# Patient Record
Sex: MALE | Race: WHITE | Employment: FULL TIME | URBAN - METROPOLITAN AREA
[De-identification: names, ages, dates, MRNs, and addresses within clinical notes are randomized per-mention and may not be internally consistent; named-entity substitution may affect disease eponyms.]

---

## 2017-01-05 ENCOUNTER — OFFICE VISIT (OUTPATIENT)
Dept: FAMILY MEDICINE CLINIC | Age: 27
End: 2017-01-05

## 2017-01-05 VITALS
HEART RATE: 78 BPM | TEMPERATURE: 98.4 F | BODY MASS INDEX: 63.09 KG/M2 | OXYGEN SATURATION: 98 % | SYSTOLIC BLOOD PRESSURE: 120 MMHG | WEIGHT: 315 LBS | DIASTOLIC BLOOD PRESSURE: 84 MMHG

## 2017-01-05 DIAGNOSIS — J01.40 ACUTE NON-RECURRENT PANSINUSITIS: Primary | ICD-10-CM

## 2017-01-05 PROCEDURE — 99213 OFFICE O/P EST LOW 20 MIN: CPT | Performed by: FAMILY MEDICINE

## 2017-01-05 RX ORDER — CITALOPRAM 20 MG/1
20 TABLET ORAL DAILY
Qty: 30 TABLET | Refills: 2 | Status: SHIPPED | OUTPATIENT
Start: 2017-01-05 | End: 2017-10-27 | Stop reason: SDUPTHER

## 2017-01-05 RX ORDER — AZITHROMYCIN 250 MG/1
TABLET, FILM COATED ORAL
Qty: 6 TABLET | Refills: 0 | Status: SHIPPED | OUTPATIENT
Start: 2017-01-05 | End: 2017-01-15

## 2017-01-05 ASSESSMENT — ENCOUNTER SYMPTOMS
RESPIRATORY NEGATIVE: 1
SINUS PRESSURE: 1
GASTROINTESTINAL NEGATIVE: 1

## 2017-01-31 ENCOUNTER — OFFICE VISIT (OUTPATIENT)
Dept: PULMONOLOGY | Age: 27
End: 2017-01-31

## 2017-01-31 VITALS
SYSTOLIC BLOOD PRESSURE: 137 MMHG | RESPIRATION RATE: 24 BRPM | WEIGHT: 315 LBS | DIASTOLIC BLOOD PRESSURE: 87 MMHG | HEART RATE: 78 BPM | OXYGEN SATURATION: 98 % | HEIGHT: 73 IN | TEMPERATURE: 98.4 F | BODY MASS INDEX: 41.75 KG/M2

## 2017-01-31 DIAGNOSIS — G47.33 OSA TREATED WITH BIPAP: Primary | ICD-10-CM

## 2017-01-31 DIAGNOSIS — E66.01 OBESITY, MORBID, BMI 50 OR HIGHER (HCC): ICD-10-CM

## 2017-01-31 PROCEDURE — 99213 OFFICE O/P EST LOW 20 MIN: CPT | Performed by: NURSE PRACTITIONER

## 2017-01-31 ASSESSMENT — SLEEP AND FATIGUE QUESTIONNAIRES
HOW LIKELY ARE YOU TO NOD OFF OR FALL ASLEEP WHILE SITTING AND READING: 0
HOW LIKELY ARE YOU TO NOD OFF OR FALL ASLEEP IN A CAR, WHILE STOPPED FOR A FEW MINUTES IN TRAFFIC: 0
HOW LIKELY ARE YOU TO NOD OFF OR FALL ASLEEP WHILE SITTING INACTIVE IN A PUBLIC PLACE: 0
HOW LIKELY ARE YOU TO NOD OFF OR FALL ASLEEP WHILE SITTING QUIETLY AFTER LUNCH WITHOUT ALCOHOL: 0
HOW LIKELY ARE YOU TO NOD OFF OR FALL ASLEEP WHILE SITTING AND TALKING TO SOMEONE: 0
ESS TOTAL SCORE: 0
HOW LIKELY ARE YOU TO NOD OFF OR FALL ASLEEP WHEN YOU ARE A PASSENGER IN A CAR FOR AN HOUR WITHOUT A BREAK: 0
NECK CIRCUMFERENCE (INCHES): 20.5
HOW LIKELY ARE YOU TO NOD OFF OR FALL ASLEEP WHILE WATCHING TV: 0
HOW LIKELY ARE YOU TO NOD OFF OR FALL ASLEEP WHILE LYING DOWN TO REST IN THE AFTERNOON WHEN CIRCUMSTANCES PERMIT: 0

## 2017-10-27 ENCOUNTER — OFFICE VISIT (OUTPATIENT)
Dept: FAMILY MEDICINE CLINIC | Age: 27
End: 2017-10-27

## 2017-10-27 VITALS
OXYGEN SATURATION: 98 % | DIASTOLIC BLOOD PRESSURE: 84 MMHG | BODY MASS INDEX: 42.66 KG/M2 | HEART RATE: 78 BPM | HEIGHT: 72 IN | SYSTOLIC BLOOD PRESSURE: 138 MMHG | WEIGHT: 315 LBS

## 2017-10-27 DIAGNOSIS — F33.1 MODERATE RECURRENT MAJOR DEPRESSION (HCC): ICD-10-CM

## 2017-10-27 DIAGNOSIS — Z23 NEED FOR INFLUENZA VACCINATION: ICD-10-CM

## 2017-10-27 PROCEDURE — G8417 CALC BMI ABV UP PARAM F/U: HCPCS | Performed by: NURSE PRACTITIONER

## 2017-10-27 PROCEDURE — G8427 DOCREV CUR MEDS BY ELIG CLIN: HCPCS | Performed by: NURSE PRACTITIONER

## 2017-10-27 PROCEDURE — G8484 FLU IMMUNIZE NO ADMIN: HCPCS | Performed by: NURSE PRACTITIONER

## 2017-10-27 PROCEDURE — 90686 IIV4 VACC NO PRSV 0.5 ML IM: CPT | Performed by: NURSE PRACTITIONER

## 2017-10-27 PROCEDURE — 90471 IMMUNIZATION ADMIN: CPT | Performed by: NURSE PRACTITIONER

## 2017-10-27 PROCEDURE — 99213 OFFICE O/P EST LOW 20 MIN: CPT | Performed by: NURSE PRACTITIONER

## 2017-10-27 PROCEDURE — 1036F TOBACCO NON-USER: CPT | Performed by: NURSE PRACTITIONER

## 2017-10-27 RX ORDER — CITALOPRAM 20 MG/1
20 TABLET ORAL DAILY
Qty: 30 TABLET | Refills: 0 | Status: SHIPPED | OUTPATIENT
Start: 2017-10-27 | End: 2018-11-30

## 2017-10-27 ASSESSMENT — PATIENT HEALTH QUESTIONNAIRE - PHQ9
1. LITTLE INTEREST OR PLEASURE IN DOING THINGS: 2
7. TROUBLE CONCENTRATING ON THINGS, SUCH AS READING THE NEWSPAPER OR WATCHING TELEVISION: 1
3. TROUBLE FALLING OR STAYING ASLEEP: 1
SUM OF ALL RESPONSES TO PHQ QUESTIONS 1-9: 10
6. FEELING BAD ABOUT YOURSELF - OR THAT YOU ARE A FAILURE OR HAVE LET YOURSELF OR YOUR FAMILY DOWN: 1
9. THOUGHTS THAT YOU WOULD BE BETTER OFF DEAD, OR OF HURTING YOURSELF: 0
SUM OF ALL RESPONSES TO PHQ9 QUESTIONS 1 & 2: 4
10. IF YOU CHECKED OFF ANY PROBLEMS, HOW DIFFICULT HAVE THESE PROBLEMS MADE IT FOR YOU TO DO YOUR WORK, TAKE CARE OF THINGS AT HOME, OR GET ALONG WITH OTHER PEOPLE: 2
8. MOVING OR SPEAKING SO SLOWLY THAT OTHER PEOPLE COULD HAVE NOTICED. OR THE OPPOSITE, BEING SO FIGETY OR RESTLESS THAT YOU HAVE BEEN MOVING AROUND A LOT MORE THAN USUAL: 2
2. FEELING DOWN, DEPRESSED OR HOPELESS: 2
4. FEELING TIRED OR HAVING LITTLE ENERGY: 1
5. POOR APPETITE OR OVEREATING: 0

## 2017-10-27 NOTE — LETTER
[facility]  [address] [place]  Phone: [phone]  Fax: [phone]    May Burnham NP        October 27, 2017     Patient: Julisa Sim   YOB: 1990   Date of Visit: 10/27/2017       To Whom it May Concern:    To Whitlock was seen in my clinic on 10/27/2017. Due to acute exacerbation of chronic condition, patient missed work 10/24-27/2017. He may return to work on next work day without restrictions . If you have any questions or concerns, please don't hesitate to call.     Sincerely,           May Burnham NP

## 2017-10-27 NOTE — PROGRESS NOTES
Subjective:      Patient ID: Eugenio Esteves is a 32 y.o. male. HPI     Chief Complaint   Patient presents with    Depression     Depression worsened, no motivation, \"dragging me down\", missed work a few days due to worsening symptoms, +hx of depression in the past, stressed out about money, job changes. No SI thoughts or thoughts to hurt others. Reports social anxiety. Sleeping ok. Was prescribed Celexa in the past per PCP, would like to restart it. Patient  reports that he has never smoked. He has never used smokeless tobacco. He reports that he does not drink alcohol or use drugs. PHQ Scores 10/27/2017   PHQ2 Score 4   PHQ9 Score 10     Interpretation of Total Score Depression Severity: 1-4 = Minimal depression, 5-9 = Mild depression, 10-14 = Moderate depression, 15-19 = Moderately severe depression, 20-27 = Severe depression    Patient  reports that he has never smoked. He has never used smokeless tobacco. He reports that he does not drink alcohol or use drugs. Past medical, surgical, family and social history were reviewed and updated with the patient. Review of Systems   Psychiatric/Behavioral: Positive for dysphoric mood. Negative for agitation, behavioral problems, self-injury, sleep disturbance and suicidal ideas. Objective:   Physical Exam   Constitutional: He is oriented to person, place, and time. He appears well-developed. No distress. Neurological: He is alert and oriented to person, place, and time. Psychiatric: He has a normal mood and affect. His behavior is normal.   Nursing note and vitals reviewed. Assessment:      1. Moderate recurrent major depression ([facility] [address]    2. Need for influenza vaccination          Plan:      Herrimangood Salvador was seen today for depression. Diagnoses and all orders for this visit:    Moderate recurrent major depression (HCC)  -     Restart citalopram (CELEXA) 20 MG tablet; Take 1 tablet by mouth daily, call if side effects.  Suggested to see

## 2018-01-31 ENCOUNTER — TELEPHONE (OUTPATIENT)
Dept: PULMONOLOGY | Age: 28
End: 2018-01-31

## 2018-02-13 NOTE — TELEPHONE ENCOUNTER
Noted. Patient did not keep follow up appointment as was recommended. Please schedule a follow up visit for this patient when he calls requesting such appointment.

## 2018-11-30 ENCOUNTER — HOSPITAL ENCOUNTER (EMERGENCY)
Age: 28
Discharge: HOME | End: 2018-11-30
Payer: COMMERCIAL

## 2018-11-30 VITALS
BODY MASS INDEX: 42.66 KG/M2 | OXYGEN SATURATION: 99 % | SYSTOLIC BLOOD PRESSURE: 153 MMHG | HEART RATE: 97 BPM | TEMPERATURE: 98.5 F | RESPIRATION RATE: 24 BRPM | WEIGHT: 315 LBS | DIASTOLIC BLOOD PRESSURE: 90 MMHG | HEIGHT: 72 IN

## 2018-11-30 DIAGNOSIS — J01.00 ACUTE MAXILLARY SINUSITIS, RECURRENCE NOT SPECIFIED: Primary | ICD-10-CM

## 2018-11-30 PROCEDURE — 99282 EMERGENCY DEPT VISIT SF MDM: CPT

## 2018-11-30 RX ORDER — AMOXICILLIN AND CLAVULANATE POTASSIUM 875; 125 MG/1; MG/1
1 TABLET, FILM COATED ORAL 2 TIMES DAILY
Qty: 20 TABLET | Refills: 0 | Status: SHIPPED | OUTPATIENT
Start: 2018-11-30 | End: 2018-12-10

## 2018-11-30 RX ORDER — PREDNISONE 10 MG/1
60 TABLET ORAL DAILY
Qty: 30 TABLET | Refills: 0 | Status: SHIPPED | OUTPATIENT
Start: 2018-11-30 | End: 2018-12-05

## 2018-11-30 RX ORDER — FLUTICASONE PROPIONATE 50 MCG
1 SPRAY, SUSPENSION (ML) NASAL DAILY
Qty: 1 BOTTLE | Refills: 0 | Status: SHIPPED | OUTPATIENT
Start: 2018-11-30 | End: 2019-11-07

## 2018-11-30 ASSESSMENT — ENCOUNTER SYMPTOMS
EYES NEGATIVE: 1
SINUS PRESSURE: 1
RESPIRATORY NEGATIVE: 1
SINUS PAIN: 1
GASTROINTESTINAL NEGATIVE: 1
RHINORRHEA: 1

## 2018-12-01 NOTE — ED PROVIDER NOTES
Respiratory: Negative. Cardiovascular: Negative. Gastrointestinal: Negative. Genitourinary: Negative. Musculoskeletal: Negative. Skin: Negative. Neurological: Negative. Positives and Pertinent negatives as per HPI. Except as noted above in the ROS, problem specific ROS was completed and is negative. Physical Exam:  Physical Exam   Constitutional: He is oriented to person, place, and time. He appears well-developed and well-nourished. Non-toxic appearance. He does not have a sickly appearance. He does not appear ill. No distress. HENT:   Head: Normocephalic and atraumatic. Right Ear: Tympanic membrane normal. Tympanic membrane is not injected. Left Ear: Tympanic membrane normal. Tympanic membrane is not injected. Nose: Nose normal.   Mouth/Throat: Uvula is midline, oropharynx is clear and moist and mucous membranes are normal. No oropharyngeal exudate, posterior oropharyngeal edema, posterior oropharyngeal erythema or tonsillar abscesses. Eyes: Right eye exhibits no discharge. Left eye exhibits no discharge. Neck: Trachea normal, normal range of motion and full passive range of motion without pain. Neck supple. Cardiovascular: Normal rate, regular rhythm and normal heart sounds. Exam reveals no gallop. No murmur heard. Pulmonary/Chest: Effort normal and breath sounds normal. No respiratory distress. He has no wheezes. He has no rales. He exhibits no tenderness. Abdominal: Soft. Normal appearance and bowel sounds are normal. There is no tenderness. Musculoskeletal: Normal range of motion. He exhibits no deformity. Neurological: He is alert and oriented to person, place, and time. Skin: Skin is warm and dry. He is not diaphoretic. Psychiatric: He has a normal mood and affect. His behavior is normal.   Nursing note and vitals reviewed.       MEDICAL DECISION MAKING    Vitals:    Vitals:    11/30/18 2236   BP: (!) 153/90   Pulse: 97   Resp: 24   Temp: 98.5 °F PM      PATIENT REFERRED TO:  Romana Higgins DO  [address]  [address] [place] [address]  [phone]    Schedule an appointment as soon as possible for a visit   As needed, If symptoms worsen      DISCHARGE MEDICATIONS:  Discharge Medication List as of 11/30/2018 11:09 PM      START taking these medications    Details   amoxicillin-clavulanate (AUGMENTIN) 875-125 MG per tablet Take 1 tablet by mouth 2 times daily for 10 days, Disp-20 tablet, R-0Print      predniSONE (DELTASONE) 10 MG tablet Take 6 tablets by mouth daily for 5 doses, Disp-30 tablet, R-0Print      fluticasone (FLONASE) 50 MCG/ACT nasal spray 1 spray by Nasal route daily, Disp-1 Bottle, R-0Print             DISCONTINUED MEDICATIONS:  Discharge Medication List as of 11/30/2018 11:09 PM      STOP taking these medications       citalopram (CELEXA) 20 MG tablet Comments:   Reason for Stopping:                      (Please note the MDM and HPI sections of this note were completed with a voice recognition program.  Efforts weremade to edit the dictations but occasionally words are mis-transcribed.)    Electronically signed, Rosa Nieves PA-C,          Rosa Nieves PA-C  12/01/18 [id number]

## 2019-05-21 ENCOUNTER — OFFICE VISIT (OUTPATIENT)
Dept: PULMONOLOGY | Age: 29
End: 2019-05-21
Payer: COMMERCIAL

## 2019-05-21 VITALS
SYSTOLIC BLOOD PRESSURE: 146 MMHG | HEART RATE: 88 BPM | RESPIRATION RATE: 20 BRPM | OXYGEN SATURATION: 98 % | TEMPERATURE: 97.9 F | DIASTOLIC BLOOD PRESSURE: 87 MMHG | WEIGHT: 315 LBS | HEIGHT: 72 IN | BODY MASS INDEX: 42.66 KG/M2

## 2019-05-21 DIAGNOSIS — Z71.89 ENCOUNTER FOR BIPAP USE COUNSELING: ICD-10-CM

## 2019-05-21 DIAGNOSIS — G47.33 SEVERE OBSTRUCTIVE SLEEP APNEA: Primary | ICD-10-CM

## 2019-05-21 DIAGNOSIS — E66.01 OBESITY, MORBID, BMI 50 OR HIGHER (HCC): ICD-10-CM

## 2019-05-21 PROCEDURE — 99213 OFFICE O/P EST LOW 20 MIN: CPT | Performed by: NURSE PRACTITIONER

## 2019-05-21 ASSESSMENT — SLEEP AND FATIGUE QUESTIONNAIRES
HOW LIKELY ARE YOU TO NOD OFF OR FALL ASLEEP WHILE SITTING AND READING: 0
HOW LIKELY ARE YOU TO NOD OFF OR FALL ASLEEP WHEN YOU ARE A PASSENGER IN A CAR FOR AN HOUR WITHOUT A BREAK: 0
HOW LIKELY ARE YOU TO NOD OFF OR FALL ASLEEP WHILE LYING DOWN TO REST IN THE AFTERNOON WHEN CIRCUMSTANCES PERMIT: 0
HOW LIKELY ARE YOU TO NOD OFF OR FALL ASLEEP WHILE WATCHING TV: 0
ESS TOTAL SCORE: 0
NECK CIRCUMFERENCE (INCHES): 20.5
HOW LIKELY ARE YOU TO NOD OFF OR FALL ASLEEP IN A CAR, WHILE STOPPED FOR A FEW MINUTES IN TRAFFIC: 0
HOW LIKELY ARE YOU TO NOD OFF OR FALL ASLEEP WHILE SITTING INACTIVE IN A PUBLIC PLACE: 0
HOW LIKELY ARE YOU TO NOD OFF OR FALL ASLEEP WHILE SITTING QUIETLY AFTER LUNCH WITHOUT ALCOHOL: 0
HOW LIKELY ARE YOU TO NOD OFF OR FALL ASLEEP WHILE SITTING AND TALKING TO SOMEONE: 0

## 2019-05-21 NOTE — PROGRESS NOTES
Orders verbalized by Morenita August CNP;  1 yr   Orders faxed to DME:[facility].  FF Mask, Bariatric referral

## 2019-05-21 NOTE — PATIENT INSTRUCTIONS
.Please keep all of your future appointments scheduled by [facility][1] [facility] Pulmonary office. Out of respect for other patients and providers, you may be asked to reschedule your appointment if you arrive later than your scheduled appointment time. Appointments cancelled less than 24hrs in advance will be considered a no show. Patients with three missed appointments within 1 year or four missed appointments within 2 years can be dismissed from the practice. Remember to bring your sleep machine, cord and mask to each appointment    You should have a follow up appointment scheduled with a sleep medicine provider within 12 months. Routine parts, masks, tubing and filters should all be obtainable from your DME (equipment) provider. Any problems related to mask fit, comfort or functioning of equipment should also be addressed directly with your DME provider. If you are unable to resolve problems, then please notify our office and schedule an appointment to be seen sooner than the usual follow up appointment. For all patients who are evaluated for sleep disorders, never drive or operate motorized equipment while sleepy, fatigued or groggy. Please bring in all of your sleep equipment to all of your appointments, including machine, mask, cords and hoses. Here are some tips to to getting better sleep  1- Avoid napping during the day: This will ensure you are tired at bedtime. If you have to take a nap, sleep less than one hour, before 3 pm.   2- Exercise regularly, but not right before bed: but the timing of the workout is important. Exercising in the morning or early afternoon will not interfere with sleep. Exercising within two hours before bedtime can decrease your ability to fall asleep. Regular exercise is recommended to help you deepen the sleep. 3- Avoid heavy, spicy, or sugary foods 4-6 hours before bedtime: These can affect your ability to stay asleep.   4- Have a light snack before bed: Having an empty stomach can interfere with your sleep. Dairy products and turkey contain tryptophan, which acts as a natural sleep inducer. 5- Stay away from caffeine, nicotine and alcohol at least 4-6 hours before bed: Caffeine and nicotine are stimulants that interfere with your ability to fall asleep. While alcohol has an immediate sleep-inducing effect, a few hours later, as alcohol levels in your blood start to fall, there is a stimulant effect and you will experience fragmented sleep. 6- Take a hot bath 90 minutes before bedtime:  A hot bath will raise your body temperature, but it is the drop in body temperature that may leave you feeling sleepy  7- Develop sleep rituals: it is important to give your body cues that it is time to slow down and sleep. Listen to relaxing music, read something soothing for 15 minutes, have a cup of caffeine free tea, or do relaxation exercises such as yoga or deep breathing help relieve anxiety and reduce muscle tension. 8- Fix a bedtime and an awakening time: Even on weekends! When your sleep cycle has a regular rhythm, you will feel better. 9- Sleep only when sleepy: This reduces the time you are awake in bed. 10- Get into your favorite sleeping position: If you can't fall asleep within 15-30 minutes, get up and do something boring until you feel sleepy. Sit quietly in the dark or read the warranty on your refrigerator. Don't expose yourself to bright light while you are up, it gives cues to your brain that it is time to wake up. 11- Only use your bed for sleeping: Dont use the bed as an office, workroom or recreation room. Let your body \"know\" that the bed is associated with sleeping  12- Use comfortable bedding. Uncomfortable bedding can prevent good sleep. Evaluate whether or not this is a source of your problem, and make appropriate changes. 13- Make sure your bed and bedroom are quiet and comfortable: A hot room can be uncomfortable.  A cooler room, along with enough blankets to stay warm is recommended. Get a blackout shade or wear a slumber mask and wear earplugs or get a \"white noise\" machine for light and noise distractions. 14- Use sunlight to set your biological clock: When you get up in the morning, go outside and turn your face to the sun for 15 minutes. 13- Dont take your worries to bed: Leave worries about job, school, daily life, etc., behind when you go to bed. Some people find it useful to assign a \"worry period\" during the evening or afternoon for these issues. CPAP Equipment Cleaning and Disinfecting Schedule  Equipment Cleaning Frequency Instructions  Disinfecting Frequency   Non-Disposable Filters  Weekly Mild soapy water, Rinse, Air Dry Not Required   Disposable Filters Change as needed  2-4 weeks Do Not Wash Not Required   Hose/tubing Daily Mild soapy water, Rinse, Air Dry Once a week   Mask / Nasal Pillows Daily Mild soapy water, Rinse, Air Dry Once a week   Headgear Weekly Hand wash, Mild soapy water, Rinse, Dry  Not Required   Humidifier Daily Empty water daily  Mild soapy water, Rinse well, Air Dry  Once a week   CPAP Unit As Needed Dust with damp cloth,  No detergents or sprays Not Required         Disinfect (per schedule) with 1 part white vinegar and 3 parts water- soak mask and water chamber for 30 minutes every 1-2 weeks, more often if sick. Allow water/vinegar mixture to run through tubing. Allow all equipment to air dry. Drying Hints:   Always hang tubing away from direct sunlight, as this will cause the tubing to become yellow, brittle and crack over a period of time. DO NOT attach the wet tubing to your CPAP unit to blow-dry it. The moisture from the tubing can drain back into your machine. Moisture in your unit can cause sudden pressure increases or short circuits  DO's and DON'Ts:  - Don't use alcohol-based products to clean your mask, because it can cause the materials to become hard and brittle.    - Don't put headgear in the washer or dryer  - Don't use any caustic or household cleaning solutions such as bleach on your CPAP   equipment.  - Do follow the recommended cleaning schedule. - Do change your disposable filter frequently. Adapted From: [email or web address]. shtm.   These are general suggestions for all models please follow specific s recommendations and specific instructions

## 2019-05-21 NOTE — PROGRESS NOTES
88, temperature 97.9 °F (36.6 °C), temperature source Oral, resp. rate 20, height 6' (1.829 m), weight (!) 504 lb (228.6 kg), SpO2 98 %. 'RA  Gen: No acute distress. Obese. BMI of 68.35  Eyes: PERRL. No sclera icterus. No conjunctival injection. ENT: No discharge. Pharynx clear. Mallampati class IV. Neck: Trachea midline. No obvious mass. Neck circumference 20.5\"  Resp: No accessory muscle use. No crackles. No wheezes. No rhonchi. CV: Regular rate. Regular rhythm. No murmur or rub. Skin: Warm and dry. M/S: No cyanosis. No obvious joint deformity. Neuro: Awake. Alert. Moves all four extremities. Psych: Oriented x 3. No anxiety. Data:   Split night 8/25/15: .7 Oxygen desaturation to 72%. Study showed controlled sleep related breathing disorder with BiPAP. Started on AutoBiPAP EPAP min 11, IPAP max 20 and PS of 4 cmH2O. BIPAP compliance data:  Compliance download report from 1/1/17to 1/30/17 reviewed today by me and showed patient is using machine 7:57 hrs/night with 100% compliance and AHI 4.0 within this time frame. 30/30days with greater than 4 hours of machine use. 90% pressure 19.4/15.4 cm H20    Compliance download report from 4/20/19 to 5/19/19 reviewed today by me and showed patient is using machine 7:52 hrs/night with 100% compliance and AHI 1.9 within this time frame. 30/30days with greater than 4 hours of machine use. 90% pressure 19.4/15.4 cm H20 on auto BIPAP    Assessment:      1. Severe TORI. .7. Auto BIPAP EPAP min 13, IPAP max 22, PS 4, Full face mask. Optimal compliance and efficacy on review today. 2. Witnessed apnea - resolved on BIPAP  3. HTN  4. Obesity      Plan:      - Continue  AutoBiPAP EPAP min 13, IPAP max 22 and PS of 4 cmH2O.  -Order for new supplies  - [employer]- send referral today  - Advised to use BiPAP 6-8 hrs at night and during naps. - Replacement of mask, tubing, head straps every 3-6 months or sooner if damaged. - Patient instructed to contact DME company for any mask, tubing or machine trouble shooting if problems arise.  - Sleep hygiene  - Avoid sedatives, alcohol and caffeinated drinks at bed time. - Patient counseled to never drive or operate heavy machinery while fatigue, drowsy or sleepy.    - Weight loss is recommended as a long-term intervention.    - Complications of TORI if not treated were discussed with patient patient, including: systemic hypertension, pulmonary hypertension, cardiovascular morbidities, car accidents and all cause mortality.  -Patient education handout provided regarding sleep tips and PAP cleaning recommendations           Follow-up: 1 year, sooner if needed

## 2019-11-07 ENCOUNTER — OFFICE VISIT (OUTPATIENT)
Dept: FAMILY MEDICINE CLINIC | Age: 29
End: 2019-11-07
Payer: COMMERCIAL

## 2019-11-07 VITALS — SYSTOLIC BLOOD PRESSURE: 150 MMHG | DIASTOLIC BLOOD PRESSURE: 96 MMHG | OXYGEN SATURATION: 98 % | HEART RATE: 96 BPM

## 2019-11-07 DIAGNOSIS — G47.33 SEVERE OBSTRUCTIVE SLEEP APNEA: ICD-10-CM

## 2019-11-07 DIAGNOSIS — I10 ESSENTIAL HYPERTENSION: ICD-10-CM

## 2019-11-07 DIAGNOSIS — E55.9 VITAMIN D DEFICIENCY: ICD-10-CM

## 2019-11-07 DIAGNOSIS — Z23 NEED FOR INFLUENZA VACCINATION: ICD-10-CM

## 2019-11-07 DIAGNOSIS — E66.01 OBESITY, MORBID, BMI 50 OR HIGHER (HCC): ICD-10-CM

## 2019-11-07 DIAGNOSIS — Z00.00 HEALTHCARE MAINTENANCE: Primary | ICD-10-CM

## 2019-11-07 PROCEDURE — 90471 IMMUNIZATION ADMIN: CPT | Performed by: FAMILY MEDICINE

## 2019-11-07 PROCEDURE — 99214 OFFICE O/P EST MOD 30 MIN: CPT | Performed by: FAMILY MEDICINE

## 2019-11-07 PROCEDURE — 90686 IIV4 VACC NO PRSV 0.5 ML IM: CPT | Performed by: FAMILY MEDICINE

## 2019-11-07 RX ORDER — BLOOD PRESSURE TEST KIT
1 KIT MISCELLANEOUS DAILY
Qty: 1 KIT | Refills: 0 | Status: SHIPPED | OUTPATIENT
Start: 2019-11-07 | End: 2020-05-20 | Stop reason: CLARIF

## 2019-11-07 RX ORDER — ACETAMINOPHEN 500 MG
500 TABLET ORAL EVERY 6 HOURS PRN
COMMUNITY
End: 2020-07-09 | Stop reason: ALTCHOICE

## 2019-11-07 RX ORDER — LISINOPRIL 10 MG/1
10 TABLET ORAL DAILY
Qty: 30 TABLET | Refills: 1 | Status: SHIPPED | OUTPATIENT
Start: 2019-11-07 | End: 2020-01-02

## 2019-11-07 ASSESSMENT — ENCOUNTER SYMPTOMS
COUGH: 0
CHEST TIGHTNESS: 0
VOMITING: 0
SHORTNESS OF BREATH: 0
ABDOMINAL PAIN: 0
NAUSEA: 0

## 2020-01-02 ENCOUNTER — OFFICE VISIT (OUTPATIENT)
Dept: FAMILY MEDICINE CLINIC | Age: 30
End: 2020-01-02
Payer: COMMERCIAL

## 2020-01-02 VITALS
SYSTOLIC BLOOD PRESSURE: 140 MMHG | BODY MASS INDEX: 41.75 KG/M2 | HEART RATE: 102 BPM | OXYGEN SATURATION: 98 % | DIASTOLIC BLOOD PRESSURE: 80 MMHG | WEIGHT: 315 LBS | HEIGHT: 73 IN

## 2020-01-02 PROCEDURE — 99213 OFFICE O/P EST LOW 20 MIN: CPT | Performed by: FAMILY MEDICINE

## 2020-01-02 RX ORDER — LISINOPRIL AND HYDROCHLOROTHIAZIDE 12.5; 1 MG/1; MG/1
1 TABLET ORAL DAILY
Qty: 90 TABLET | Refills: 0 | Status: SHIPPED | OUTPATIENT
Start: 2020-01-02 | End: 2020-03-25

## 2020-01-02 RX ORDER — LISINOPRIL 10 MG/1
TABLET ORAL
Qty: 30 TABLET | Refills: 1 | OUTPATIENT
Start: 2020-01-02

## 2020-01-02 ASSESSMENT — ENCOUNTER SYMPTOMS
CHEST TIGHTNESS: 0
COUGH: 0
SHORTNESS OF BREATH: 0

## 2020-01-02 NOTE — PATIENT INSTRUCTIONS
Essential hypertension  Finish the present medication and I will send over a prescription for lisinopril 10 mg/hydrochlorothiazide 12-1/2- see me 3 months. Obesity, morbid, BMI 50 or higher ([address]  Work on weight loss by lowering carbs and increasing activity and I told him to call me if he would like to see the bariatric medicine department for an evaluation. Other orders  -     lisinopril-hydrochlorothiazide (PRINZIDE;ZESTORETIC) 10-12.5 MG per tablet; Take 1 tablet by mouth daily    See me 3 months.

## 2020-01-02 NOTE — PROGRESS NOTES
Subjective:      Patient ID: Erika Bhatti is a 34 y.o. male. HPI  Patient in for 1 month checkup on blood pressure. He had been off the medication for a while because he was running okay and he saw Dr. Falcon Friend a month ago and she put him back on the lisinopril. No ill effects from the medication and the pressure is better but still a few points above normal.  He has gained a lot of weight in the last few years. Prior to Visit Medications :  Medication lisinopril-hydrochlorothiazide (PRINZIDE;ZESTORETIC) 10-12.5 MG per tablet, Sig Take 1 tablet by mouth daily, Taking? Yes, Authorizing Provider Abraham Das, DO    Medication acetaminophen (TYLENOL) 500 MG tablet, Sig Take 500 mg by mouth every 6 hours as needed for Pain, Taking? Yes, Authorizing Provider Historical Provider, MD    Medication Blood Pressure KIT, Sig 1 kit by Does not apply route daily, Taking? Yes, Authorizing Provider Jason Canavan, MD      Past Medical History:  No date: Sleep apnea        Review of Systems    Review of Systems   Constitutional: Positive for unexpected weight change. Respiratory: Negative for cough, chest tightness and shortness of breath. Cardiovascular: Negative for chest pain, palpitations and leg swelling. Neurological: Negative for dizziness, light-headedness and headaches. Objective:   Physical Exam      Physical Exam  Constitutional:       Appearance: Normal appearance. He is obese. Cardiovascular:      Rate and Rhythm: Normal rate and regular rhythm. Heart sounds: Normal heart sounds. Pulmonary:      Effort: Pulmonary effort is normal.      Breath sounds: Normal breath sounds. Abdominal:      Palpations: Abdomen is soft. Tenderness: There is no tenderness. Musculoskeletal:      Right lower leg: No edema. Left lower leg: No edema. Neurological:      Mental Status: He is oriented to person, place, and time. Assessment:      1. Essential hypertension    2.  Obesity, morbid, BMI 50 or higher ([facility]            Plan:      Sarah Bose was seen today for discuss medications. Diagnoses and all orders for this visit:    Essential hypertension  Finish the present medication and I will send over a prescription for lisinopril 10 mg/hydrochlorothiazide 12-1/2- see me 3 months. Obesity, morbid, BMI 50 or higher ([facility]  Work on weight loss by lowering carbs and increasing activity and I told him to call me if he would like to see the bariatric medicine department for an evaluation. Other orders  -     lisinopril-hydrochlorothiazide (PRINZIDE;ZESTORETIC) 10-12.5 MG per tablet; Take 1 tablet by mouth daily    See me 3 months.           Abraham Das, DO

## 2020-01-31 ENCOUNTER — TELEPHONE (OUTPATIENT)
Dept: FAMILY MEDICINE CLINIC | Age: 30
End: 2020-01-31

## 2020-01-31 NOTE — TELEPHONE ENCOUNTER
Pt. Is asking for a referral to a Bariatric  Doctor.   Please call patient when referral has been placed

## 2020-02-04 NOTE — TELEPHONE ENCOUNTER
Patient requesting a [employer] message or email be sent with the referral info. He was not able to write down the info when he called.

## 2020-03-09 ENCOUNTER — TELEPHONE (OUTPATIENT)
Dept: BARIATRICS/WEIGHT MGMT | Age: 30
End: 2020-03-09

## 2020-05-14 ENCOUNTER — TELEPHONE (OUTPATIENT)
Dept: PULMONOLOGY | Age: 30
End: 2020-05-14

## 2020-05-16 ENCOUNTER — TELEPHONE (OUTPATIENT)
Dept: FAMILY MEDICINE CLINIC | Age: 30
End: 2020-05-16

## 2020-05-18 RX ORDER — LISINOPRIL AND HYDROCHLOROTHIAZIDE 12.5; 1 MG/1; MG/1
1 TABLET ORAL DAILY
Qty: 90 TABLET | Refills: 0 | Status: SHIPPED | OUTPATIENT
Start: 2020-05-18 | End: 2020-06-27 | Stop reason: SDUPTHER

## 2020-05-20 ENCOUNTER — VIRTUAL VISIT (OUTPATIENT)
Dept: PULMONOLOGY | Age: 30
End: 2020-05-20
Payer: COMMERCIAL

## 2020-05-20 PROCEDURE — 99213 OFFICE O/P EST LOW 20 MIN: CPT | Performed by: NURSE PRACTITIONER

## 2020-05-20 ASSESSMENT — SLEEP AND FATIGUE QUESTIONNAIRES
HOW LIKELY ARE YOU TO NOD OFF OR FALL ASLEEP WHILE SITTING AND TALKING TO SOMEONE: 0
HOW LIKELY ARE YOU TO NOD OFF OR FALL ASLEEP IN A CAR, WHILE STOPPED FOR A FEW MINUTES IN TRAFFIC: 0
HOW LIKELY ARE YOU TO NOD OFF OR FALL ASLEEP WHILE SITTING AND READING: 0
HOW LIKELY ARE YOU TO NOD OFF OR FALL ASLEEP WHILE SITTING INACTIVE IN A PUBLIC PLACE: 0
ESS TOTAL SCORE: 1
HOW LIKELY ARE YOU TO NOD OFF OR FALL ASLEEP WHILE LYING DOWN TO REST IN THE AFTERNOON WHEN CIRCUMSTANCES PERMIT: 1
HOW LIKELY ARE YOU TO NOD OFF OR FALL ASLEEP WHILE SITTING QUIETLY AFTER LUNCH WITHOUT ALCOHOL: 0
HOW LIKELY ARE YOU TO NOD OFF OR FALL ASLEEP WHEN YOU ARE A PASSENGER IN A CAR FOR AN HOUR WITHOUT A BREAK: 0
HOW LIKELY ARE YOU TO NOD OFF OR FALL ASLEEP WHILE WATCHING TV: 0

## 2020-05-20 NOTE — PROGRESS NOTES
MA Communication: The following orders are received by verbal communication from SELVIN Gallagher.     Orders include:  Orders faxed to [facility]       31-90 day fu scheduled 7/19/20

## 2020-05-20 NOTE — PATIENT INSTRUCTIONS
Remember to bring all pulmonary medications to your next appointment with the office. Please keep all of your future appointments scheduled by [address], [facility] Pulmonary office. Out of respect for other patients and providers, you may be asked to reschedule your appointment if you arrive later than your scheduled appointment time. Appointments cancelled less than 24hrs in advance will be considered a no show. Patients with three missed appointments within 1 year or four missed appointments within 2 years can be dismissed from the practice. You may receive a survey regarding the care you received during your visit. Your input is valuable to us. We encourage you to complete and return your survey. We hope you will choose us in the future for your healthcare needs. Sleep Hygiene. .. Tips for better sleep. .. Avoid naps. This will ensure you are sleepy at bedtime. If you have to take a nap, sleep less than 1 hour, before 3 pm.  Sleep only when sleepy; this reduces the time you are awake in bed. Regular exercise is recommended to help you deepen your sleep, but not within 4-6 hours of your bedtime. Timing of exercise is important, aim to exercise early in the morning or early afternoon. A light snack may help you fall asleep. Warm milk and foods high in the amino acid tryptophan, such as bananas, may help you to sleep  Be sure to avoid heavy, spicy or sugary foods 4-6 hours before bedtime and avoid at snack time. Stay away from stimulants such as caffeine and nicotine for at least 4-6 hours before bed. Stimulants can interfere with your ability to fall asleep. Caffeine is found in tea, cola, coffee, cocoa and chocolate and is best avoided at bedtime. Nicotine is found in tobacco products. Avoid alcohol 4-6 hours before bedtime.  Alcohol has an immediate sleep-inducing effect, after a few hours when alcohol levels fall there is a stimulant or wake-up effect and will cause

## 2020-06-04 ENCOUNTER — TELEPHONE (OUTPATIENT)
Dept: PULMONOLOGY | Age: 30
End: 2020-06-04

## 2020-06-04 NOTE — TELEPHONE ENCOUNTER
Karishma Reis with [facility] called stating that Lyndsey Valero will not approve new machine unless the note includes that patients machine is old (pt received it 215-5 years old), that it's obsolete and per Karishma Reis it is as they can no longer supply him with supplies and that he is benefiting  from the machine. Please advise if note can be addended. Assessment:5/20/20 VV   1. Severe TORI. .7. Auto BIPAP EPAP min 13, IPAP max 22, PS 4, Full face mask. Optimal compliance and efficacy on review today. 2. Witnessed apnea - resolved on BIPAP  3. HTN  4. Obesity                Plan:   - Order new  AutoBiPAP EPAP min 13, IPAP max 22 and PS of 4 cmH2O.  - Advised to use BiPAP 6-8 hrs at night and during naps. - Replacement of mask, tubing, head straps every 3-6 months or sooner if damaged. - Patient instructed to contact [employer] company for any mask, tubing or machine trouble shooting if problems arise.  - Sleep hygiene  - Avoid sedatives, alcohol and caffeinated drinks at bed time. - Patient counseled to never drive or operate heavy machinery while fatigue, drowsy or sleepy. - Weight loss is recommended as a long-term intervention.    - Complications of TORI if not treated were discussed with patient patient, including: systemic hypertension, pulmonary hypertension, cardiovascular morbidities, car accidents and all cause mortality.  -Patient education regarding sleep tips and PAP cleaning recommendations                       Consent for telehealth visit was obtained and is noted in chart        THIS VISIT WAS COMPLETED VIRTUALLY USING DOXY. DADA Edmond is a 34 y.o. male being evaluated by a Virtual Visit (video visit) encounter to address concerns as mentioned above.  A caregiver was present when appropriate.  Due to this being a TeleHealth encounter (During UBQPQ-05 public health emergency), evaluation of the following organ systems was limited: Vitals/Constitutional/EENT/Resp/CV/GI//MS/Neuro/Skin/Heme-Lymph-Imm.  Pursuant to the emergency declaration under the [address] [place] [address], [facility] waiver authority and the [employer] Act, this Virtual Visit was conducted with patient's (and/or legal guardian's) consent, to reduce the patient's risk of exposure to COVID-19 and provide necessary medical care.  The patient (and/or legal guardian) has also been advised to contact this office for worsening conditions or problems, and seek emergency medical treatment and/or call 911 if deemed necessary.     Patient identification was verified at the start of the visit: Yes     Total time spent for this encounter: Not billed by time     Services were provided through a video synchronous discussion virtually to substitute for in-person clinic visit.  Patient and provider were located at their individual homes.     --ABDON Engel - CNP on 5/20/2020 at 1:48 PM     An electronic signature was used to authenticate this note.

## 2020-06-18 ENCOUNTER — NURSE TRIAGE (OUTPATIENT)
Dept: OTHER | Facility: CLINIC | Age: 30
End: 2020-06-18

## 2020-06-27 ENCOUNTER — TELEPHONE (OUTPATIENT)
Dept: FAMILY MEDICINE CLINIC | Age: 30
End: 2020-06-27

## 2020-06-29 RX ORDER — LISINOPRIL AND HYDROCHLOROTHIAZIDE 12.5; 1 MG/1; MG/1
1 TABLET ORAL DAILY
Qty: 90 TABLET | Refills: 0 | Status: SHIPPED | OUTPATIENT
Start: 2020-06-29 | End: 2020-08-25 | Stop reason: SDUPTHER

## 2020-07-09 ENCOUNTER — VIRTUAL VISIT (OUTPATIENT)
Dept: FAMILY MEDICINE CLINIC | Age: 30
End: 2020-07-09
Payer: COMMERCIAL

## 2020-07-09 PROCEDURE — 99214 OFFICE O/P EST MOD 30 MIN: CPT | Performed by: PHYSICIAN ASSISTANT

## 2020-07-09 ASSESSMENT — ENCOUNTER SYMPTOMS
PHOTOPHOBIA: 0
EYE DISCHARGE: 0
EYE ITCHING: 0
EYE PAIN: 0
EYE REDNESS: 0
SHORTNESS OF BREATH: 0
COLOR CHANGE: 0

## 2020-07-09 NOTE — PROGRESS NOTES
2020    TELEHEALTH EVALUATION -- Audio/Visual (During [facility] public health emergency)    HPI:    Gary Nick (:  1990) has requested an audio/video evaluation for the following concern(s):    HTN:  Blood pressure at home is running 130-240/80-90. Denies headaches, shortness of breath, chest pain, lower extremity swelling. SE of medication: some lightheadedness if he moves too quickly. Vision Change: For the last two weeks the patient has had intermittent blurring of vision in both eyes. Episodes can last several hours. Does not typically occur everyday. He saw an optometrist and stated that his vision has slightly changed but there was no concerning issue with his eyes or optic nerve that they could see, per the patient. He has missed two days or work because his vision was so bad that he couldn't see his computer screen. Has checked his blood pressure when he was having an episode and has always been in a normal range. Very rarely he will have a migraine around the time of the vision change. There is no associated eye pain or discomfort, discharge, itching eyes, dizziness, lightheadedness. Risk factors: obesity. + family hx of thyroid disease    Review of Systems   Constitutional: Negative for activity change, appetite change and unexpected weight change. Eyes: Positive for visual disturbance. Negative for photophobia, pain, discharge, redness and itching. Respiratory: Negative for shortness of breath. Cardiovascular: Negative for chest pain, palpitations and leg swelling. Skin: Negative for color change, pallor and rash. Neurological: Positive for light-headedness and headaches. Negative for dizziness, weakness and numbness. Hematological: Negative for adenopathy. Does not bruise/bleed easily. Psychiatric/Behavioral: Negative for confusion and dysphoric mood. Prior to Visit Medications    Medication Sig Taking?  Authorizing Provider   lisinopril-hydroCHLOROthiazide (PRINZIDE;ZESTORETIC) 10-12.5 MG per tablet Take 1 tablet by mouth daily Yes Abraham Das, DO       Social History     Tobacco Use    Smoking status: Never Smoker    Smokeless tobacco: Never Used   Substance Use Topics    Alcohol use: No     Alcohol/week: 0.0 standard drinks    Drug use: No        No Known Allergies,   Past Medical History:   Diagnosis Date    Sleep apnea    ,   Past Surgical History:   Procedure Laterality Date    TONSILLECTOMY     ,   Social History     Tobacco Use    Smoking status: Never Smoker    Smokeless tobacco: Never Used   Substance Use Topics    Alcohol use: No     Alcohol/week: 0.0 standard drinks    Drug use: No       PHYSICAL EXAMINATION:  [ INSTRUCTIONS:  \"[x]\" Indicates a positive item  \"[]\" Indicates a negative item  -- DELETE ALL ITEMS NOT EXAMINED]  Vital Signs: (As obtained by patient/caregiver or practitioner observation)    Blood pressure-  Heart rate-    Respiratory rate- 14   Temperature-  Pulse oximetry-     Constitutional: [x] Appears well-developed and well-nourished [x] No apparent distress      [] Abnormal-   Mental status  [x] Alert and awake  [x] Oriented to person/place/time [x]Able to follow commands      Eyes:  EOM    [x]  Normal  [] Abnormal-  Sclera  [x]  Normal  [] Abnormal -         Discharge [x]  None visible  [] Abnormal - Pt blinking during interview several times    HENT:   [x] Normocephalic, atraumatic.   [] Abnormal   [x] Mouth/Throat: Mucous membranes are moist.     External Ears [x] Normal  [] Abnormal-     Neck: [x] No visualized mass     Pulmonary/Chest: [x] Respiratory effort normal.  [x] No visualized signs of difficulty breathing or respiratory distress        [] Abnormal-      Musculoskeletal:   [] Normal gait with no signs of ataxia         [x] Normal range of motion of neck        [] Abnormal-       Neurological:        [x] No Facial Asymmetry (Cranial nerve 7 motor function) (limited exam to video visit)          [] No gaze palsy [] Abnormal-         Skin:        [x] No significant exanthematous lesions or discoloration noted on facial skin         [] Abnormal-            Psychiatric:       [] Normal Affect [] No Hallucinations        [] Abnormal-     Other pertinent observable physical exam findings-     ASSESSMENT/PLAN:  1. Blurred vision, bilateral  -  Pt to send in FMLA to be filled out. Has missed two full days of work. 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 Discussed giving him intermittent leave until the end of the month since blurred vision is occurring intermittently. -  Possible graves vs dm vs migraine  -  If lab work is negative would consider MRI  - TSH without Reflex; Future  - T4, FREE; Future  - Hemoglobin A1C; Future  - C-REACTIVE PROTEIN; Future  - SEDIMENTATION RATE; Future    2. Essential (primary) hypertension  -  Continue current medication. BP needs to be taken in office when he comes in for lab results. - COMPREHENSIVE METABOLIC PANEL; Future  - CBC; Future    3. Screening for human immunodeficiency virus  - HIV Screen; Future      No follow-ups on file. Alma Lopez is a 34 y.o. male being evaluated by a Virtual Visit (video visit) encounter to address concerns as mentioned above. A caregiver was present when appropriate. Due to this being a TeleHealth encounter (During [facility] public health emergency), evaluation of the following organ systems was limited: Vitals/Constitutional/EENT/Resp/CV/GI//MS/Neuro/Skin/Heme-Lymph-Imm. Pursuant to the emergency declaration under the [address] [place] [address], [address] [place] [address] authority and the [employer] Act, this Virtual Visit was conducted with patient's (and/or legal guardian's) consent, to reduce the patient's risk of exposure to COVID-19 and provide necessary medical care.   The patient (and/or legal guardian) has also been advised to contact this office for worsening conditions or problems, and seek emergency medical treatment and/or call 911 if deemed necessary. Patient identification was verified at the start of the visit: Yes    Total time spent on this encounter: Not billed by time    Services were provided through a video synchronous discussion virtually to substitute for in-person clinic visit. Patient and provider were located at their individual homes. --BRITTNEE Castaneda on 7/9/2020 at 10:16 AM    An electronic signature was used to authenticate this note.

## 2020-07-14 ENCOUNTER — TELEPHONE (OUTPATIENT)
Dept: FAMILY MEDICINE CLINIC | Age: 30
End: 2020-07-14

## 2020-07-17 DIAGNOSIS — H53.8 BLURRED VISION, BILATERAL: ICD-10-CM

## 2020-07-17 DIAGNOSIS — I10 ESSENTIAL (PRIMARY) HYPERTENSION: ICD-10-CM

## 2020-07-17 DIAGNOSIS — Z11.4 SCREENING FOR HUMAN IMMUNODEFICIENCY VIRUS: ICD-10-CM

## 2020-07-18 LAB
A/G RATIO: 1.1 (ref 1.1–2.2)
ALBUMIN SERPL-MCNC: 4 G/DL (ref 3.4–5)
ALP BLD-CCNC: 55 U/L (ref 40–129)
ALT SERPL-CCNC: 39 U/L (ref 10–40)
ANION GAP SERPL CALCULATED.3IONS-SCNC: 13 MMOL/L (ref 3–16)
AST SERPL-CCNC: 23 U/L (ref 15–37)
BILIRUB SERPL-MCNC: <0.2 MG/DL (ref 0–1)
BUN BLDV-MCNC: 10 MG/DL (ref 7–20)
C-REACTIVE PROTEIN: 22.8 MG/L (ref 0–5.1)
CALCIUM SERPL-MCNC: 9.6 MG/DL (ref 8.3–10.6)
CHLORIDE BLD-SCNC: 101 MMOL/L (ref 99–110)
CO2: 27 MMOL/L (ref 21–32)
CREAT SERPL-MCNC: 0.6 MG/DL (ref 0.9–1.3)
ESTIMATED AVERAGE GLUCOSE: 111.2 MG/DL
GFR AFRICAN AMERICAN: >60
GFR NON-AFRICAN AMERICAN: >60
GLOBULIN: 3.5 G/DL
GLUCOSE BLD-MCNC: 86 MG/DL (ref 70–99)
HBA1C MFR BLD: 5.5 %
HCT VFR BLD CALC: 43.5 % (ref 40.5–52.5)
HEMOGLOBIN: 13.9 G/DL (ref 13.5–17.5)
HIV AG/AB: NORMAL
HIV ANTIGEN: NORMAL
HIV-1 ANTIBODY: NORMAL
HIV-2 AB: NORMAL
MCH RBC QN AUTO: 26.1 PG (ref 26–34)
MCHC RBC AUTO-ENTMCNC: 32 G/DL (ref 31–36)
MCV RBC AUTO: 81.5 FL (ref 80–100)
PDW BLD-RTO: 16.9 % (ref 12.4–15.4)
PLATELET # BLD: 389 K/UL (ref 135–450)
PMV BLD AUTO: 8.5 FL (ref 5–10.5)
POTASSIUM SERPL-SCNC: 4.7 MMOL/L (ref 3.5–5.1)
RBC # BLD: 5.33 M/UL (ref 4.2–5.9)
SEDIMENTATION RATE, ERYTHROCYTE: 45 MM/HR (ref 0–15)
SODIUM BLD-SCNC: 141 MMOL/L (ref 136–145)
T4 FREE: 1.4 NG/DL (ref 0.9–1.8)
TOTAL PROTEIN: 7.5 G/DL (ref 6.4–8.2)
TSH SERPL DL<=0.05 MIU/L-ACNC: 3.2 UIU/ML (ref 0.27–4.2)
WBC # BLD: 10.4 K/UL (ref 4–11)

## 2020-07-23 ENCOUNTER — TELEPHONE (OUTPATIENT)
Dept: FAMILY MEDICINE CLINIC | Age: 30
End: 2020-07-23

## 2020-07-23 ENCOUNTER — HOSPITAL ENCOUNTER (OUTPATIENT)
Dept: MRI IMAGING | Age: 30
Discharge: HOME | End: 2020-07-23
Payer: COMMERCIAL

## 2020-08-05 ENCOUNTER — TELEPHONE (OUTPATIENT)
Dept: FAMILY MEDICINE CLINIC | Age: 30
End: 2020-08-05

## 2020-08-05 ENCOUNTER — NURSE TRIAGE (OUTPATIENT)
Dept: OTHER | Facility: CLINIC | Age: 30
End: 2020-08-05

## 2020-08-05 ENCOUNTER — OFFICE VISIT (OUTPATIENT)
Dept: PRIMARY CARE CLINIC | Age: 30
End: 2020-08-05
Payer: COMMERCIAL

## 2020-08-05 PROCEDURE — 99211 OFF/OP EST MAY X REQ PHY/QHP: CPT | Performed by: NURSE PRACTITIONER

## 2020-08-05 RX ORDER — AMOXICILLIN AND CLAVULANATE POTASSIUM 875; 125 MG/1; MG/1
1 TABLET, FILM COATED ORAL 2 TIMES DAILY
Qty: 20 TABLET | Refills: 0 | Status: SHIPPED | OUTPATIENT
Start: 2020-08-05 | End: 2020-08-14 | Stop reason: CLARIF

## 2020-08-05 RX ORDER — PREDNISONE 10 MG/1
TABLET ORAL
Qty: 30 TABLET | Refills: 0 | Status: SHIPPED | OUTPATIENT
Start: 2020-08-05 | End: 2021-04-13 | Stop reason: ALTCHOICE

## 2020-08-05 NOTE — TELEPHONE ENCOUNTER
Reason for Disposition   Continuous (nonstop) coughing interferes with work or school and no improvement using cough treatment per Care Advice    Answer Assessment - Initial Assessment Questions  1. ONSET: \"When did the cough begin? \"       Cough started last week with a head cold. Congestion seems better  2. SEVERITY: \"How bad is the cough today? \"    cough is pretty consistent. Cough is productive at times  3. RESPIRATORY DISTRESS: \"Describe your breathing. \"   A little sob when moving around  4. FEVER: \"Do you have a fever? \" If so, ask: \"What is your temperature, how was it measured, and when did it start? \"  No fever  5. HEMOPTYSIS: \"Are you coughing up any blood? \" If so ask: \"How much? \" (flecks, streaks, tablespoons, etc.)    6. TREATMENT: \"What have you done so far to treat the cough? \" (e.g., meds, fluids, humidifier)   dayquil and nyquil, sudafed  7. CARDIAC HISTORY: \"Do you have any history of heart disease? \" (e.g., heart attack, congestive heart failure)   no  8. LUNG HISTORY: \"Do you have any history of lung disease? \"  (e.g., pulmonary embolus, asthma, emphysema)    no  9. PE RISK FACTORS: \"Do you have a history of blood clots? \" (or: recent major surgery, recent prolonged travel, bedridden)    no  10. OTHER SYMPTOMS: \"Do you have any other symptoms? (e.g., runny nose, wheezing, chest pain)  Body aches. 11. PREGNANCY: \"Is there any chance you are pregnant? \" \"When was your last menstrual period? \"       na  12. TRAVEL: \"Have you traveled out of the country in the last month? \" (e.g., travel history, exposures)    Protocols used: COUGH-ADULT-[place]    Received call from [facility]. Pt calling with cough since last week. Cough has been persistent. Feeling sob with exertion. No fever, no covid exposure. Recommend pt is seen today, call sooner if worsens. Call soft transferred to [facility] in [address] to schedule appointment.     Please do not reply to the triage nurse through this encounter. Any subsequent communication should be directly with the patient.

## 2020-08-05 NOTE — PROGRESS NOTES
Jeff Guo received a viral test for COVID-19. They were educated on isolation and quarantine as appropriate. For any symptoms, they were directed to seek care from their PCP, given contact information to establish with a doctor, directed to an urgent care or the emergency room.

## 2020-08-07 LAB — SARS-COV-2, NAA: NOT DETECTED

## 2020-08-14 ENCOUNTER — VIRTUAL VISIT (OUTPATIENT)
Dept: PULMONOLOGY | Age: 30
End: 2020-08-14
Payer: COMMERCIAL

## 2020-08-14 PROCEDURE — 99213 OFFICE O/P EST LOW 20 MIN: CPT | Performed by: NURSE PRACTITIONER

## 2020-08-14 ASSESSMENT — SLEEP AND FATIGUE QUESTIONNAIRES
ESS TOTAL SCORE: 1
HOW LIKELY ARE YOU TO NOD OFF OR FALL ASLEEP WHILE SITTING INACTIVE IN A PUBLIC PLACE: 0
HOW LIKELY ARE YOU TO NOD OFF OR FALL ASLEEP IN A CAR, WHILE STOPPED FOR A FEW MINUTES IN TRAFFIC: 0
HOW LIKELY ARE YOU TO NOD OFF OR FALL ASLEEP WHILE LYING DOWN TO REST IN THE AFTERNOON WHEN CIRCUMSTANCES PERMIT: 1
HOW LIKELY ARE YOU TO NOD OFF OR FALL ASLEEP WHILE SITTING AND READING: 0
HOW LIKELY ARE YOU TO NOD OFF OR FALL ASLEEP WHILE WATCHING TV: 0
HOW LIKELY ARE YOU TO NOD OFF OR FALL ASLEEP WHEN YOU ARE A PASSENGER IN A CAR FOR AN HOUR WITHOUT A BREAK: 0
HOW LIKELY ARE YOU TO NOD OFF OR FALL ASLEEP WHILE SITTING QUIETLY AFTER LUNCH WITHOUT ALCOHOL: 0
HOW LIKELY ARE YOU TO NOD OFF OR FALL ASLEEP WHILE SITTING AND TALKING TO SOMEONE: 0

## 2020-08-14 NOTE — PROGRESS NOTES
Subjective:      Patient ID: Gary Nick is a 34 y.o. male. HPI     Gary Nick is a 34 y.o. male for televideo appointment via video and audio doxy. me virtual visit for TORI follow up. States he is doing well with new BIPAP. Patient is using BiPAP   8 hrs/night. Not using humidifier. No snoring on BiPAP. The pressure is well tolerated. The mask is comfortable- full face. No mask leak. No significant daytime sleepiness. No nodding off when driving. No dry nose or throat. No fatigue. Bedtime is 10 pm- 2 am and rise time is 8-9 am. Sleep onset is few minutes. Wakes up 0 times at night total. No nocturia. No naps during the day. No headache in am. No weight gain. 1-2 caffienated beverages during the day. No alcohol. ESS is 1. Past Medical History:   Diagnosis Date    Sleep apnea        Past Surgical History:        Procedure Laterality Date    TONSILLECTOMY         Allergies:  has No Known Allergies. Social History:    TOBACCO:   reports that he has never smoked. He has never used smokeless tobacco.  ETOH:   reports no history of alcohol use. Family History:       Problem Relation Age of Onset    Diabetes Mother     Hypertension Mother     Cancer Maternal Grandmother         Breast    Asthma Neg Hx     Emphysema Neg Hx     Heart Failure Neg Hx        Current Medications:    Current Outpatient Medications:     predniSONE (DELTASONE) 10 MG tablet, Take 4 tablets daily for 3 days, 3 tablets for 3 days, 2 tablets for 3 days and 1 tablet for 3 days, Disp: 30 tablet, Rfl: 0    lisinopril-hydroCHLOROthiazide (PRINZIDE;ZESTORETIC) 10-12.5 MG per tablet, Take 1 tablet by mouth daily, Disp: 90 tablet, Rfl: 0    Review of Systems    Objective:   Physical Exam  There were no vitals taken for this visit. 'RA  Exam:  Gen: No acute distress, does not appear to be in pain. Appears well developed and nourished. HENT: Head is normocephalic and atraumatic. Normal appearing nose.  External Ears normal.   Neck: No visualized mass. Trachea is midline   Eyes: EOM intact. No visible discharge. Resp:No visualized signs of difficulty breathing or respiratory distress, speaking in full sentences. Respiratory effort normal.  Neuro: Awake. Alert. Able to follow commands. No facial asymmetry. Psych: Oriented x 3. No anxiety. Normal affect. Data:   Split night 8/25/15: .7 Oxygen desaturation to 72%. Study showed controlled sleep related breathing disorder with BiPAP. Started on AutoBiPAP EPAP min 11, IPAP max 20 and PS of 4 cmH2O. BIPAP compliance data:  Compliance download report from 1/1/17to 1/30/17 reviewed today by me and showed patient is using machine 7:57 hrs/night with 100% compliance and AHI 4.0 within this time frame. 30/30days with greater than 4 hours of machine use. 90% pressure 19.4/15.4 cm H20    Compliance download report from 4/20/19 to 5/19/19 reviewed today by me and showed patient is using machine 7:52 hrs/night with 100% compliance and AHI 1.9 within this time frame. 30/30days with greater than 4 hours of machine use. 90% pressure 19.4/15.4 cm H20 on auto BIPAP    Compliance download report from 4/15/20 to 5/14/20 reviewed today by me and showed patient is using machine 7:45 hrs/night with 100% compliance and AHI 1.4 within this time frame. 30/30days with greater than 4 hours of machine use. 90% pressure 20/16 cm H20 on auto BIPAP 13-22, PS 4 cm H2O    NEW BIPAP:  Compliance download report from 6/12/20 to 8/12/20 reviewed today by me and showed patient is using machine 8:02 hrs/night with 100% compliance and AHI 1.4 within this time frame. 30/30days with greater than 4 hours of machine use. 90% pressure 18.9/14.9 cm H20 on auto BIPAP 13-22, PS 4 cm H2O      Assessment:      1. Severe TORI. .7. Auto BIPAP EPAP min 13, IPAP max 22, PS 4, Full face mask. Optimal compliance and efficacy on review today. 2. Witnessed apnea - resolved on BIPAP  3.  HTN  4. Obesity Plan:      - Continue AutoBiPAP EPAP min 13, IPAP max 22 and PS of 4 cmH2O.  - Advised to use BiPAP 6-8 hrs at night and during naps. - Replacement of mask, tubing, head straps every 3-6 months or sooner if damaged. - Patient instructed to contact DME company for any mask, tubing or machine trouble shooting if problems arise.  - Sleep hygiene  - Avoid sedatives, alcohol and caffeinated drinks at bed time. - Patient counseled to never drive or operate heavy machinery while fatigue, drowsy or sleepy. - Weight loss is recommended as a long-term intervention.    - Complications of TORI if not treated were discussed with patient patient, including: systemic hypertension, pulmonary hypertension, cardiovascular morbidities, car accidents and all cause mortality.  -Patient education regarding sleep tips and PAP cleaning recommendations           Consent for telehealth visit was obtained and is noted in chart      THIS VISIT [facility]. Gissel Hernandez is a 34 y.o. male being evaluated by a Virtual Visit (video visit) encounter to address concerns as mentioned above. A caregiver was present when appropriate. Due to this being a TeleHealth encounter (During [facility]-53 public health emergency), evaluation of the following organ systems was limited: Vitals/Constitutional/EENT/Resp/CV/GI//MS/Neuro/Skin/Heme-Lymph-Imm. Pursuant to the emergency declaration under the [facility] authority and the [employer] and [employer] Act, this Virtual Visit was conducted with patient's (and/or legal guardian's) consent, to reduce the patient's risk of exposure to COVID-19 and provide necessary medical care. The patient (and/or legal guardian) has also been advised to contact this office for worsening conditions or problems, and seek emergency medical treatment and/or call 911 if deemed necessary.      Patient identification was verified at the start of the visit: Yes    Total time spent for this encounter: Not billed by time    Services were provided through a video synchronous discussion virtually to substitute for in-person clinic visit. Patient and provider were located at their individual homes. --ABDON Reyna CNP on 8/14/2020 at 11:42 AM    An electronic signature was used to authenticate this note.

## 2020-08-14 NOTE — PATIENT INSTRUCTIONS
Here are some tips to to getting better sleep  1- Avoid napping during the day: This will ensure you are tired at bedtime. If you have to take a nap, sleep less than one hour, before 3 pm.   2- Exercise regularly, but not right before bed: but the timing of the workout is important. Exercising in the morning or early afternoon will not interfere with sleep. Exercising within two hours before bedtime can decrease your ability to fall asleep. Regular exercise is recommended to help you deepen the sleep. 3- Avoid heavy, spicy, or sugary foods 4-6 hours before bedtime: These can affect your ability to stay asleep. 4- Have a light snack before bed: Having an empty stomach can interfere with your sleep. Dairy products and turkey contain tryptophan, which acts as a natural sleep inducer. 5- Stay away from caffeine, nicotine and alcohol at least 4-6 hours before bed: Caffeine and nicotine are stimulants that interfere with your ability to fall asleep. While alcohol has an immediate sleep-inducing effect, a few hours later, as alcohol levels in your blood start to fall, there is a stimulant effect and you will experience fragmented sleep. 6- Take a hot bath 90 minutes before bedtime:  A hot bath will raise your body temperature, but it is the drop in body temperature that may leave you feeling sleepy  7- Develop sleep rituals: it is important to give your body cues that it is time to slow down and sleep. Listen to relaxing music, read something soothing for 15 minutes, have a cup of caffeine free tea, or do relaxation exercises such as yoga or deep breathing help relieve anxiety and reduce muscle tension. 8- Fix a bedtime and an awakening time: Even on weekends! When your sleep cycle has a regular rhythm, you will feel better. 9- Sleep only when sleepy: This reduces the time you are awake in bed.    10- Get into your favorite sleeping position: If you can't fall asleep within 15-30 minutes, get up and do something boring until you feel sleepy. Sit quietly in the dark or read the warranty on your refrigerator. Don't expose yourself to bright light while you are up, it gives cues to your brain that it is time to wake up. 11- Only use your bed for sleeping: Dont use the bed as an office, workroom or recreation room. Let your body \"know\" that the bed is associated with sleeping  12- Use comfortable bedding. Uncomfortable bedding can prevent good sleep. Evaluate whether or not this is a source of your problem, and make appropriate changes. 13- Make sure your bed and bedroom are quiet and comfortable: A hot room can be uncomfortable. A cooler room, along with enough blankets to stay warm is recommended. Get a blackout shade or wear a slumber mask and wear earplugs or get a \"white noise\" machine for light and noise distractions. 14- Use sunlight to set your biological clock: When you get up in the morning, go outside and turn your face to the sun for 15 minutes. 13- Dont take your worries to bed: Leave worries about job, school, daily life, etc., behind when you go to bed. Some people find it useful to assign a \"worry period\" during the evening or afternoon for these issues.     CPAP Equipment Cleaning and Disinfecting Schedule  Equipment Cleaning Frequency Instructions  Disinfecting Frequency   Non-Disposable Filters  Weekly Mild soapy water, Rinse, Air Dry Not Required   Disposable Filters Change as needed  2-4 weeks Do Not Wash Not Required   Hose/tubing Daily Mild soapy water, Rinse, Air Dry Once a week   Mask / Nasal Pillows Daily Mild soapy water, Rinse, Air Dry Once a week   Headgear Weekly Hand wash, Mild soapy water, Rinse, Dry  Not Required   Humidifier Daily Empty water daily  Mild soapy water, Rinse well, Air Dry  Once a week   CPAP Unit As Needed Dust with damp cloth,  No detergents or sprays Not Required         Disinfect (per schedule) with 1 part white vinegar and 3 parts water- soak mask and water chamber for 30 minutes every 1-2 weeks, more often if sick. Allow water/vinegar mixture to run through tubing. Allow all equipment to air dry. Drying Hints:   Always hang tubing away from direct sunlight, as this will cause the tubing to become yellow, brittle and crack over a period of time. DO NOT attach the wet tubing to your CPAP unit to blow-dry it. The moisture from the tubing can drain back into your machine. Moisture in your unit can cause sudden pressure increases or short circuits  DO's and DON'Ts:  - Don't use alcohol-based products to clean your mask, because it can cause the materials to become hard and brittle. - Don't put headgear in the washer or dryer  - Don't use any caustic or household cleaning solutions such as bleach on your CPAP   equipment.  - Do follow the recommended cleaning schedule. - Do change your disposable filter frequently. Adapted From: [employer]/cleaning. shtm.   These are general suggestions for all models please follow specific s recommendations and specific instructions

## 2020-08-25 ENCOUNTER — TELEPHONE (OUTPATIENT)
Dept: FAMILY MEDICINE CLINIC | Age: 30
End: 2020-08-25

## 2020-08-25 RX ORDER — PREDNISONE 10 MG/1
TABLET ORAL
Qty: 30 TABLET | Refills: 0 | OUTPATIENT
Start: 2020-08-25

## 2020-09-01 ENCOUNTER — TELEMEDICINE (OUTPATIENT)
Dept: FAMILY MEDICINE CLINIC | Age: 30
End: 2020-09-01
Payer: COMMERCIAL

## 2020-09-01 PROCEDURE — 99442 PR PHYS/QHP TELEPHONE EVALUATION 11-20 MIN: CPT | Performed by: FAMILY MEDICINE

## 2020-09-01 NOTE — PROGRESS NOTES
machine or if they know if somewhere he could go and then to let me know. Also told him to call back in 1 week and talk with either me or Nikki Oneal regarding how much time he was off the computer in the previous week. He will probably need to have the inflammatory testing repeated. Obesity, morbid, BMI 50 or higher ([facility] [address]  Work on weight loss by reducing carbs and increasing activity. This was approximately a 15-minute telephone visit- he was alone on the visit-we have his permission for the visit and I was in my office at [facility].         Abraham Das, DO

## 2020-09-01 NOTE — PATIENT INSTRUCTIONS
Blurred vision, bilateral  I told him to call you see MRI department and see if they have a larger machine or if they know if somewhere he could go and then to let me know. Also told him to call back in 1 week and talk with either me or Remedios Hemphill regarding how much time he was off the computer in the previous week. He will probably need to have the inflammatory testing repeated. Obesity, morbid, BMI 50 or higher ([facility] [address]  Work on weight loss by reducing carbs and increasing activity.

## 2021-02-26 ENCOUNTER — TELEPHONE (OUTPATIENT)
Dept: BARIATRICS/WEIGHT MGMT | Age: 31
End: 2021-02-26

## 2021-02-26 NOTE — TELEPHONE ENCOUNTER
Patient called the office stating he went to Dr Nerissa Mejia bariatric seminar last year (3/2020) At that time his benefits were verificated by our office and he did not have BWLS coverage. He states he does have coverage now and wanted to move forward with scheduling. It was explained to patient our office needed to reverify benefits prior to scheduling. Pt verbalized understanding. Will contact patient once verification is complete. Thank You

## 2021-03-09 NOTE — TELEPHONE ENCOUNTER
Patient DOES have BWLS coverage with [employer] (No specified diet) Spoke with patient. Info given. Pt verbalized understanding. Appt sched. Np pk mailed. BMI > 40, No prev wgt loss surgery per patient.

## 2021-04-12 ENCOUNTER — TELEPHONE (OUTPATIENT)
Dept: BARIATRICS/WEIGHT MGMT | Age: 31
End: 2021-04-12

## 2021-04-13 ENCOUNTER — OFFICE VISIT (OUTPATIENT)
Dept: BARIATRICS/WEIGHT MGMT | Age: 31
End: 2021-04-13
Payer: COMMERCIAL

## 2021-04-13 VITALS
HEART RATE: 96 BPM | SYSTOLIC BLOOD PRESSURE: 128 MMHG | WEIGHT: 315 LBS | BODY MASS INDEX: 41.75 KG/M2 | DIASTOLIC BLOOD PRESSURE: 78 MMHG | HEIGHT: 73 IN | RESPIRATION RATE: 16 BRPM

## 2021-04-13 DIAGNOSIS — G47.33 SEVERE OBSTRUCTIVE SLEEP APNEA: ICD-10-CM

## 2021-04-13 DIAGNOSIS — Z01.818 PRE-OPERATIVE CLEARANCE: ICD-10-CM

## 2021-04-13 DIAGNOSIS — I10 ESSENTIAL HYPERTENSION: ICD-10-CM

## 2021-04-13 DIAGNOSIS — E66.01 MORBID OBESITY WITH BMI OF 70 AND OVER, ADULT (HCC): Primary | ICD-10-CM

## 2021-04-13 PROCEDURE — 99205 OFFICE O/P NEW HI 60 MIN: CPT | Performed by: SURGERY

## 2021-04-13 NOTE — PATIENT INSTRUCTIONS
Patient received dietary handouts and education. Pre-operative work up Ordered:    - [employer]. - Psych Evaluation.   - Cardiac Clearance. - BiPAP Compliance. - EGD (endoscopy to check your stomach). - Support Group Attendance. - Obtain letter of medical necessity (PCP Letter). - Quit Smoking,  Alcohol, Caffeine and Carbonated Drinks  - Obtain records for Weight History 2 yrs. - Start Regular Exercise and track your activities. - Start Tracking your food Intake and follow dietary guidelines. - Avoid Pregnancy for 2 yrs from date of surgery. (for female patients in childbearing age)  - F/U in 4 weeks. - Need BMI <65 or weight < 490 lbs        Patient advised that its their responsibility to follow up for studies, referrals and/or labs ordered today.

## 2021-04-13 NOTE — PROGRESS NOTES
Diabetes Mother     Hypertension Mother     Cancer Maternal Grandmother         Breast    Asthma Neg Hx     Emphysema Neg Hx     Heart Failure Neg Hx      Social History     Tobacco Use    Smoking status: Never Smoker    Smokeless tobacco: Never Used   Substance Use Topics    Alcohol use: No     Alcohol/week: 0.0 standard drinks     I counseled the patient on the importance of not smoking and risks of ETOH. No Known Allergies  Vitals:    04/13/21 1112   BP: 128/78   Pulse: 96   Resp: 16   Weight: (!) 550 lb (249.5 kg)   Height: 6' 1\" (1.854 m)       Body mass index is 72.56 kg/m². Current Outpatient Medications:     lisinopril-hydroCHLOROthiazide (PRINZIDE;ZESTORETIC) 20-25 MG per tablet, TAKE 1/2 TABLET BY MOUTH EVERY DAY, Disp: 45 tablet, Rfl: 1      Review of Systems - History obtained from the patient  General ROS: overweight   Psychological ROS: negative  Ophthalmic ROS: negative  Neurological ROS: negative  ENT ROS: negative  Allergy and Immunology ROS: negative  Hematological and Lymphatic ROS: negative  Endocrine ROS: overweight  Breast ROS: negative  Respiratory ROS: negative  Cardiovascular ROS: negative  Gastrointestinal ROS:negative  Genito-Urinary ROS: negative  Musculoskeletal ROS: weight effects on joints  Skin ROS: negative    Physical Exam   Constitutional: Patient is oriented to person, place, and time. Vital signs are normal. Patient  appears well-developed and well-nourished. Patient  is active and cooperative. Non-toxic appearance. No distress. HENT:   Head: Normocephalic and atraumatic. Head is without laceration. Right Ear: External ear normal. No lacerations. No drainage, swelling or tenderness. Left Ear: External ear normal. No lacerations. No drainage, swelling or tenderness. Nose/Mouth/Throat: Patient is wearing mask due to Covid-19 pandemic precautions, following CDC and health authorities guidelines.    Eyes: Conjunctivae, EOM and lids are normal. Pupils are equal, round, and reactive to light. Right eye exhibits no discharge. No foreign body present in the right eye. Left eye exhibits no discharge. No foreign body present in the left eye. No scleral icterus. Neck: Trachea normal and normal range of motion. Neck supple. No JVD present. No tracheal tenderness present. Carotid bruit is not present. No rigidity. No tracheal deviation and no edema present. No thyromegaly present. Cardiovascular: Normal rate, regular rhythm, normal heart sounds, intact distal pulses and normal pulses. Pulmonary/Chest: Effort normal and breath sounds normal. No stridor. No respiratory distress. Patient  has no wheezes. Patient has no rales. Patient exhibits no tenderness and no crepitus. Abdominal: Soft. Normal appearance and bowel sounds are normal. Patient exhibits no distension, no abdominal bruit, no ascites and no mass. There is no hepatosplenomegaly. There is no tenderness. There is no rigidity, no rebound, no guarding and no CVA tenderness. No hernia. Hernia confirmed negative in the ventral area. Musculoskeletal: Normal range of motion. Patient exhibits no edema or tenderness. Lymphadenopathy:        Head (right side): No submental, no submandibular, no preauricular, no posterior auricular and no occipital adenopathy present. Head (left side): No submental, no submandibular, no preauricular, no posterior auricular and no occipital adenopathy present. Patient  has no cervical adenopathy. Right: No supraclavicular adenopathy present. Left: No supraclavicular adenopathy present. Neurological: Patient is alert and oriented to person, place, and time. Patient has normal strength. Coordination and gait normal. GCS eye subscore is 4. GCS verbal subscore is 5. GCS motor subscore is 6. Skin: Skin is warm and dry. No abrasion and no rash noted. Patient  is not diaphoretic. No cyanosis or erythema. Psychiatric: Patient has a normal mood and affect. speech is normal and behavior is normal. Cognition and memory are normal.         Alex was seen today for bariatric, initial visit. Diagnoses and all orders for this visit:    Morbid obesity with BMI of 70 and over, adult ([facility] [address]  -     CBC Auto Differential; Future  -     Comprehensive Metabolic Panel; Future  -     Hemoglobin A1C; Future  -     Iron and TIBC; Future  -     Lipid Panel; Future  -     TSH with Reflex; Future  -     Vitamin A; Future  -     Vitamin B1, Whole Blood; Future  -     Vitamin B12 & Folate; Future  -     Vitamin D 25 Hydroxy; Future  -     Vitamin E; Future  -     Protime-INR; Future  -     Ambulatory referral to Cardiology    Pre-operative clearance  -     CBC Auto Differential; Future  -     Comprehensive Metabolic Panel; Future  -     Hemoglobin A1C; Future  -     Iron and TIBC; Future  -     Lipid Panel; Future  -     TSH with Reflex; Future  -     Vitamin A; Future  -     Vitamin B1, Whole Blood; Future  -     Vitamin B12 & Folate; Future  -     Vitamin D 25 Hydroxy; Future  -     Vitamin E; Future  -     Protime-INR; Future  -     Ambulatory referral to Cardiology    Essential hypertension  -     CBC Auto Differential; Future  -     Comprehensive Metabolic Panel; Future  -     Hemoglobin A1C; Future  -     Iron and TIBC; Future  -     Lipid Panel; Future  -     TSH with Reflex; Future  -     Vitamin A; Future  -     Vitamin B1, Whole Blood; Future  -     Vitamin B12 & Folate; Future  -     Vitamin D 25 Hydroxy; Future  -     Vitamin E; Future  -     Protime-INR; Future  -     Ambulatory referral to Cardiology    Severe obstructive sleep apnea  -     CBC Auto Differential; Future  -     Comprehensive Metabolic Panel; Future  -     Hemoglobin A1C; Future  -     Iron and TIBC; Future  -     Lipid Panel; Future  -     TSH with Reflex; Future  -     Vitamin A; Future  -     Vitamin B1, Whole Blood; Future  -     Vitamin B12 & Folate; Future  -     Vitamin D 25 Hydroxy;  Future  - Vitamin E; Future  -     Protime-INR; Future  -     Ambulatory referral to Cardiology          A/P  Manford Schilder is a very pleasant 27 y.o. male with Obesity,  Body mass index is 72.56 kg/m². and multiple obesity related co-morbidities. Manford Schilder is very motivated to lose weight and being more healthy. We discussed how his weight affects his overall health including:  Patient Active Problem List   Diagnosis    Essential hypertension    Vitamin D deficiency    Depressed    Achilles tendinitis of left lower extremity    Morbid obesity with BMI of 70 and over, adult ([facility] [address]    Ganglion cyst of right foot    Severe obstructive sleep apnea    Pre-operative clearance      The patient underwent extensive dietary counseling. I have reviewed, discussed and agree with the dietary plan. Medical weight loss and different surgical options were discussed in details with patient. Andrea Zhou is interested in surgical weight loss for future weight loss. Patient is interested in Laparoscopic Sleeve Gastrectomy, which I believe is an excellent option. We will proceed with pre-operative work up labs and studies. Will also petition patient's  insurance for approval for this procedure. I advised the patient that we can't guarantee final insurance approval.    Patient received dietary handouts and education. Patient advised that its their responsibility to follow up for studies, referrals and/or labs ordered today. Also discussed in details the importance of follow up, as well following the recommendations and completing the whole program to improve outcomes when it comes to healthier lifestyle as well weight loss. Patient also advised about risks and benefits being on a strict dietary regimen as well using supplements. Patient agrees and wants to proceed with weight loss planning. Informed the patient that he needs to get his BMI less than 65 before we can proceed with surgery planning.     Obesity as a disease is [place]

## 2021-04-13 NOTE — Clinical Note
Greatly appreciate the referral.  Excellent candidate for weight loss. We will keep you posted on Dennis progress.

## 2021-04-21 ENCOUNTER — TELEPHONE (OUTPATIENT)
Dept: FAMILY MEDICINE CLINIC | Age: 31
End: 2021-04-21

## 2021-04-26 ENCOUNTER — TELEMEDICINE (OUTPATIENT)
Dept: FAMILY MEDICINE CLINIC | Age: 31
End: 2021-04-26
Payer: COMMERCIAL

## 2021-04-26 DIAGNOSIS — H18.609 KERATOCONUS, UNSPECIFIED LATERALITY: Primary | ICD-10-CM

## 2021-04-26 PROCEDURE — 99213 OFFICE O/P EST LOW 20 MIN: CPT | Performed by: PHYSICIAN ASSISTANT

## 2021-04-26 ASSESSMENT — ENCOUNTER SYMPTOMS
NAUSEA: 0
EYE ITCHING: 0
VOMITING: 0
PHOTOPHOBIA: 0
EYE REDNESS: 0
EYE DISCHARGE: 0
EYE PAIN: 0

## 2021-04-26 NOTE — PROGRESS NOTES
2021    TELEHEALTH EVALUATION -- Audio/Visual (During QHXBT-86 public health emergency)    HPI:    Elsi Fonseca (:  1990) has requested an audio/video evaluation for the following concern(s):    The pt is here for follow up of his FMLA. Since our last appointment he has been diagnosed with Keratoconus. He has a follow up appointment with [facility] on 21. The pt's FMLA paperwork has run out and he has new paperwork for [employer]. 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 The pt still reports episodes where he is unable to see  His computer screen. These periods can last minutes to days. He has been taking no more than 1-2 days off work over the last year. Review of Systems   Constitutional: Negative for activity change and appetite change. Eyes: Positive for visual disturbance. Negative for photophobia, pain, discharge, redness and itching. Gastrointestinal: Negative for nausea and vomiting. Neurological: Positive for headaches. Negative for dizziness and light-headedness. Prior to Visit Medications    Medication Sig Taking?  Authorizing Provider   lisinopril-hydroCHLOROthiazide (PRINZIDE;ZESTORETIC) 20-25 MG per tablet TAKE 1/2 TABLET BY MOUTH EVERY DAY  Abraham Das DO       Social History     Tobacco Use    Smoking status: Never Smoker    Smokeless tobacco: Never Used   Substance Use Topics    Alcohol use: No     Alcohol/week: 0.0 standard drinks    Drug use: No        No Known Allergies    PHYSICAL EXAMINATION:  [ INSTRUCTIONS:  \"[x]\" Indicates a positive item  \"[]\" Indicates a negative item  -- DELETE ALL ITEMS NOT EXAMINED]  Vital Signs: (As obtained by patient/caregiver or practitioner observation)    Blood pressure-  Heart rate-    Respiratory rate-14    Temperature-  Pulse oximetry-     Constitutional: [x] Appears well-developed and well-nourished [x] No provide care. Total time spent on this encounter: Not billed by time    --BRITTNEE Cornejo on 4/26/2021 at 3:34 PM    An electronic signature was used to authenticate this note.

## 2021-04-28 DIAGNOSIS — J01.90 ACUTE NON-RECURRENT SINUSITIS, UNSPECIFIED LOCATION: Primary | ICD-10-CM

## 2021-04-28 RX ORDER — PREDNISONE 20 MG/1
20 TABLET ORAL 2 TIMES DAILY
Qty: 8 TABLET | Refills: 0 | Status: SHIPPED | OUTPATIENT
Start: 2021-04-28 | End: 2021-05-02

## 2021-04-28 RX ORDER — AZITHROMYCIN 250 MG/1
250 TABLET, FILM COATED ORAL SEE ADMIN INSTRUCTIONS
Qty: 6 TABLET | Refills: 0 | Status: SHIPPED | OUTPATIENT
Start: 2021-04-28 | End: 2021-05-03

## 2021-05-11 ENCOUNTER — TELEPHONE (OUTPATIENT)
Dept: BARIATRICS/WEIGHT MGMT | Age: 31
End: 2021-05-11

## 2021-05-13 PROBLEM — Z01.818 PRE-OPERATIVE CLEARANCE: Status: RESOLVED | Noted: 2021-04-13 | Resolved: 2021-05-13

## 2021-05-18 ENCOUNTER — OFFICE VISIT (OUTPATIENT)
Dept: BARIATRICS/WEIGHT MGMT | Age: 31
End: 2021-05-18
Payer: COMMERCIAL

## 2021-05-18 VITALS
WEIGHT: 315 LBS | HEART RATE: 94 BPM | BODY MASS INDEX: 41.75 KG/M2 | DIASTOLIC BLOOD PRESSURE: 82 MMHG | RESPIRATION RATE: 16 BRPM | HEIGHT: 73 IN | SYSTOLIC BLOOD PRESSURE: 134 MMHG | OXYGEN SATURATION: 98 %

## 2021-05-18 DIAGNOSIS — E55.9 VITAMIN D DEFICIENCY: ICD-10-CM

## 2021-05-18 DIAGNOSIS — G47.33 SEVERE OBSTRUCTIVE SLEEP APNEA: ICD-10-CM

## 2021-05-18 DIAGNOSIS — M76.62 ACHILLES TENDINITIS OF LEFT LOWER EXTREMITY: ICD-10-CM

## 2021-05-18 DIAGNOSIS — I10 ESSENTIAL HYPERTENSION: ICD-10-CM

## 2021-05-18 DIAGNOSIS — E66.01 MORBID OBESITY WITH BMI OF 60.0-69.9, ADULT (HCC): ICD-10-CM

## 2021-05-18 DIAGNOSIS — E66.01 MORBID OBESITY WITH BMI OF 70 AND OVER, ADULT (HCC): Primary | ICD-10-CM

## 2021-05-18 PROCEDURE — 99214 OFFICE O/P EST MOD 30 MIN: CPT | Performed by: SURGERY

## 2021-05-18 NOTE — PROGRESS NOTES
[facility]) Physicians   [facility]  Don Parada MD, [address] [facility], [address] [place] [address]    [address] [id number] . Phone: [phone]  Fax: [phone]          Chief Complaint   Patient presents with    Obesity     2nd pre-surg BCBS         HPI:     Harvey Grayson is a very pleasant 27 y.o. male with Body mass index is 69.79 kg/m². / Chronic Obesity. Camilo Palacios has been struggling for several years now with obesity. Camilo Palacios feels the weight is an obstacle to achieve and perform things in daily living as well risk on health. Patient  is very determined to lose weight and be healthy, and is working towards  surgical weight loss to achieve this goal. Pre-operative clearance and work up pending. Working hard to keep good dietary habits as well level of activity. Patient denies any nausea, vomiting, fevers, chills, shortness of breath, chest pain, cough, constipation or difficulty urinating. Pain Assessment   Denies any abdominal pain       Past Medical History:   Diagnosis Date    Essential hypertension     [facility]bid [facility] with BMI of 70 and over[facility] 6/10/2015    Sleep apnea      Past Surgical History:   Procedure Laterality Date    TONSILLECTOMY       Family History   Problem Relation Age of Onset    Hypertension Father     Diabetes Mother     Hypertension Mother     Cancer Maternal Grandmother         Breast    Asthma Neg Hx     Emphysema Neg Hx     Heart Failure Neg Hx      Social History     Tobacco Use    Smoking status: Never Smoker    Smokeless tobacco: Never Used   Substance Use Topics    Alcohol use: No     Alcohol/week: 0.0 standard drinks     I counseled the patient on the importance of not smoking and risks of ETOH. No Known Allergies  Vitals:    05/18/21 1216   BP: 134/82   Pulse: 94   Resp: 16   SpO2: 98%   Weight: (!) 529 lb (240 kg)   Height: 6' 1\" (1.854 m)       Body mass index is 69.79 kg/m².     Lab Results   Component Value Date    WBC 10.4 07/17/2020    RBC 5.33 07/17/2020    HGB 13.9 07/17/2020    HCT 43.5 07/17/2020    MCV 81.5 07/17/2020    MCH 26.1 07/17/2020    MCHC 32.0 07/17/2020    MPV 8.5 07/17/2020    NEUTOPHILPCT 67.7 05/11/2016    LYMPHOPCT 23.2 05/11/2016    MONOPCT 6.7 05/11/2016    EOSRELPCT 2.2 05/11/2016    BASOPCT 0.2 05/11/2016    NEUTROABS 7.3 05/11/2016    LYMPHSABS 2.5 05/11/2016    MONOSABS 0.7 05/11/2016    EOSABS 0.2 05/11/2016     Lab Results   Component Value Date     07/17/2020    K 4.7 07/17/2020     07/17/2020    CO2 27 07/17/2020    ANIONGAP 13 07/17/2020    GLUCOSE 86 07/17/2020    BUN 10 07/17/2020    CREATININE 0.6 07/17/2020    LABGLOM >60 07/17/2020    GFRAA >60 07/17/2020    GFRAA >60 12/22/2012    CALCIUM 9.6 07/17/2020    PROT 7.5 07/17/2020    PROT 7.2 12/22/2012    LABALBU 4.0 07/17/2020    AGRATIO 1.1 07/17/2020    BILITOT <0.2 07/17/2020    ALKPHOS 55 07/17/2020    ALT 39 07/17/2020    AST 23 07/17/2020    GLOB 3.5 07/17/2020     Lab Results   Component Value Date    CHOL 138 12/22/2012    TRIG 84 12/22/2012    HDL 29 12/22/2012    LDLCALC 92 12/22/2012    LABVLDL 17 12/22/2012     No results found for: TSHREFLEX  No results found for: IRON, TIBC, LABIRON  No results found for: [id number], FOLATE  Lab Results   Component Value Date    VITD25 15 12/22/2012     Lab Results   Component Value Date    LABA1C 5.5 07/17/2020    .2 07/17/2020         Current Outpatient Medications:     lisinopril-hydroCHLOROthiazide (PRINZIDE;ZESTORETIC) 20-25 MG per tablet, TAKE 1/2 TABLET BY MOUTH EVERY DAY, Disp: 45 tablet, Rfl: 1    Review of Systems - History obtained from the patient  General ROS: negative  Psychological ROS: negative  Ophthalmic ROS: negative  Neurological ROS: negative  ENT ROS: negative  Allergy and Immunology ROS: negative  Hematological and Lymphatic ROS: negative  Endocrine ROS: negative  Breast ROS: negative  Respiratory ROS: negative  Cardiovascular ROS: negative  Gastrointestinal Patient is monitoring portion sizes, food choices and liquid calories. Patient is trying to exercise regularly as much as possible. Obesity as a disease is considered a high risk to patients overall health and should therefore be considered a high risk disease state. Advised the patient that not getting there weight under control, that could increase risk of complications/worsening of those conditions on the long-term. (Goal of weight loss surgery is to alleviate/control some of those co-morbidities)    Now with Covid-19 pandemic, CDC and health authorities does classify obese patients as vulnerable and high risk as well. Which makes weight loss a priority for improvement of their wellbeing and overall health. I encouraged the patient to continue exercise and keeping healthy eating habits. Discussed pre-op labs and work up till now. Also counseled the patient extensively on Surgery. The visit today, included any number of the following: Bariatric Preoperative work up/protocols, review of labs, imaging, provider notes, outside hospital records, performing examination/evaluation, counseling patient and/or family, ordering medications/tests, placing referrals and communication with referring physicians, coordination of care; discussing dietary plan/recall with the patient as well with registered dietitian and documentation in the EHR. Of note, the above was done during same day of the actual patient encounter. Carrie Cruz is here for his second presurgical visit. Patient overall is doing very well. Patient has eliminated fast food and trying to be more active at home. Very pleased with his progress. Preoperative work-up was discussed with the patient and reviewed with him in details. Remaining preoperative clearances were discussed in depth with the patient as well. We will see the patient next month for continued follow-up.       We discussed how his excess weight affects his overall health and importance of weight loss, healthy diet and active lifestyle to alleviate those co morbid conditions, otherwise risk deterioration. Morbid Obesity: Body mass index is 69.79 kg/m². [x] Continue to make dietary and lifestyle modifications. [x] Plan for Future laparoscopic sleeve gastrectomy. [x] Continue to work to get BMI less than 65 for weight less than 490. [x] Return for follow-up next month. Essential Hypertension:  [x] Continue to make dietary and lifestyle modifications. [x] Reviewed the importance of checking blood pressure. [x] Continue to follow up with their PCP for medication management and monitoring. Obstructive Sleep Apnea:   [x] Continue to make dietary and lifestyle modifications. [x] Reviewed the importance of wearing / compliance with CPAP/BIPAP. [x] Continue to follow up with their sleep medicine provider for CPAP/BIPAP management and monitoring. Achilles tendinitis of left lower extremity:  [x] Continue to make dietary and lifestyle modifications. [x] Continue with weight loss. Patient advised that its their responsibility to follow up for studies, referrals and/or labs ordered today.

## 2021-05-18 NOTE — PATIENT INSTRUCTIONS
Patient received dietary handouts and education.   - Add protein based snack daily  - Switch to decaf coffee with sweetener  - Try Protein Powders  - Complete Support Group    Handouts: emailed Yelena Zelaya in a [employer]

## 2021-07-07 ENCOUNTER — TELEMEDICINE (OUTPATIENT)
Dept: BARIATRICS/WEIGHT MGMT | Age: 31
End: 2021-07-07
Payer: COMMERCIAL

## 2021-07-07 DIAGNOSIS — E55.9 VITAMIN D DEFICIENCY: ICD-10-CM

## 2021-07-07 DIAGNOSIS — E66.01 MORBID OBESITY WITH BMI OF 60.0-69.9, ADULT (HCC): Primary | ICD-10-CM

## 2021-07-07 DIAGNOSIS — I10 ESSENTIAL HYPERTENSION: ICD-10-CM

## 2021-07-07 DIAGNOSIS — G47.33 SEVERE OBSTRUCTIVE SLEEP APNEA: ICD-10-CM

## 2021-07-07 PROCEDURE — 99213 OFFICE O/P EST LOW 20 MIN: CPT | Performed by: NURSE PRACTITIONER

## 2021-07-07 ASSESSMENT — ENCOUNTER SYMPTOMS
GASTROINTESTINAL NEGATIVE: 1
RESPIRATORY NEGATIVE: 1

## 2021-07-07 NOTE — PROGRESS NOTES
[facility]) Physicians   [facility]    7/7/2021    TELEHEALTH EVALUATION -- Audio/Visual (During NQCWM-15 public health emergency)    Subjective:      Patient ID: Nohemy Cintron is a 27 y.o. male has requested an audio/video evaluation. HPI    Due to the COVID-19 restrictions on close contact interactions the patient's monthly presurgical visit was conducted via audio/video in nika of a face to face visit. Patient has consented to have this visit conducted via audio/video. The patient is here through telemedicine for their bariatric surgery presurgical visit for future weight loss. He has made several attempts at weight loss in the past without success and now wishes to pursue bariatric surgery. He is working to change his dietary behaviors and lose weight to improve comorbid conditions. Nohemy Cintron is a 27 y.o. male with current BMI of 69.79 and weight of 529 pounds. This weight is from his last office visit, no new weight to report today. He has struggled over the past month due to an eye infection that reduced his vision. He was unable to work, also caused some financial challenges. He reports he did not stay on track with the diet. Past Medical History:   Diagnosis Date    Essential hypertension     Herpes 06/2021    to eyes    Morbid obesity with BMI of 70 and over, adult ([facility] [address] 6/10/2015    Sleep apnea      Past Surgical History:   Procedure Laterality Date    TONSILLECTOMY       Family History   Problem Relation Age of Onset    Hypertension Father     Diabetes Mother     Hypertension Mother     Cancer Maternal Grandmother         Breast    Asthma Neg Hx     Emphysema Neg Hx     Heart Failure Neg Hx      Social History     Tobacco Use    Smoking status: Never Smoker    Smokeless tobacco: Never Used   Substance Use Topics    Alcohol use: No     Alcohol/week: 0.0 standard drinks     I counseled the patient on the importance of not smoking and risks of ETOH.    No Known Allergies  There were no vitals filed for this visit. There is no height or weight on file to calculate BMI. Current Outpatient Medications:     valACYclovir HCl (VALTREX PO), Take by mouth daily, Disp: , Rfl:     lisinopril-hydroCHLOROthiazide (PRINZIDE;ZESTORETIC) 20-25 MG per tablet, TAKE 1/2 TABLET BY MOUTH EVERY DAY, Disp: 45 tablet, Rfl: 1    Lab Results   Component Value Date    WBC 10.4 07/17/2020    RBC 5.33 07/17/2020    HGB 13.9 07/17/2020    HCT 43.5 07/17/2020    MCV 81.5 07/17/2020    MCH 26.1 07/17/2020    MCHC 32.0 07/17/2020    MPV 8.5 07/17/2020    NEUTOPHILPCT 67.7 05/11/2016    LYMPHOPCT 23.2 05/11/2016    MONOPCT 6.7 05/11/2016    EOSRELPCT 2.2 05/11/2016    BASOPCT 0.2 05/11/2016    NEUTROABS 7.3 05/11/2016    LYMPHSABS 2.5 05/11/2016    MONOSABS 0.7 05/11/2016    EOSABS 0.2 05/11/2016     Lab Results   Component Value Date     07/17/2020    K 4.7 07/17/2020     07/17/2020    CO2 27 07/17/2020    ANIONGAP 13 07/17/2020    GLUCOSE 86 07/17/2020    BUN 10 07/17/2020    CREATININE 0.6 07/17/2020    LABGLOM >60 07/17/2020    GFRAA >60 07/17/2020    GFRAA >60 12/22/2012    CALCIUM 9.6 07/17/2020    PROT 7.5 07/17/2020    PROT 7.2 12/22/2012    LABALBU 4.0 07/17/2020    AGRATIO 1.1 07/17/2020    BILITOT <0.2 07/17/2020    ALKPHOS 55 07/17/2020    ALT 39 07/17/2020    AST 23 07/17/2020    GLOB 3.5 07/17/2020     Lab Results   Component Value Date    CHOL 138 12/22/2012    TRIG 84 12/22/2012    HDL 29 12/22/2012    LDLCALC 92 12/22/2012    LABVLDL 17 12/22/2012     No results found for: TSHREFLEX  No results found for: IRON, TIBC, LABIRON  No results found for: Clora Fuelling  Lab Results   Component Value Date    VITD25 15 12/22/2012     Lab Results   Component Value Date    LABA1C 5.5 07/17/2020    .2 07/17/2020       Review of Systems   Constitutional: Negative. HENT: Negative. Eyes:        Right eye infection   Respiratory: Negative. Cardiovascular: Negative. exercising due to eye infection. Encouraged physical activity. Discussed preop work up which still needs labs, EGD, psych evaluation, cardiac clearance, pulmonary clearance, sleep clearance (bipap),  PCP letter, and protein sample. He is working to get his BMI below 65 in order to schedule his surgery. We will see him back in 1 month for continued follow up in office in order to get an updated weight. Essential Hypertension:  [x] Continue to make dietary and lifestyle modifications per our recommendations. [] Reviewed the importance of checking blood pressure. [x] Continue to follow up with their PCP for medication management and monitoring. Obstructive Sleep Apnea:   [x] Continue to make dietary and lifestyle modifications per our recommendations. [] Reviewed the importance of wearing your CPAP/BIPAP. [x] Continue to follow up with their sleep medicine provider for CPAP/BIPAP management and monitoring. Obesity:  [x] Continue to make dietary and lifestyle modifications per our recommendations. [x] Plan for Future laparoscopic sleeve gastrectomy. [x] Return for follow-up next month. Total encounter time: 25 minutes, including any number of the following: Bariatric Pre/Post operative work up/protocols, review of labs, imaging, provider notes, outside hospital records, performing examination/evaluation, counseling patient and/or family, ordering medications/tests, placing referrals and communication with referring physicians, coordination of care; discussing dietary plan/recall with the patient as well with registered dietitian and documentation in the EHR. Of note, the above was done during same day of the actual patient encounter. An electronic signature was used to authenticate this note.      Pursuant to the emergency declaration under the [facility], [address] [place] [address] authority and the [employer] and [employer] Act, this Virtual  Visit was conducted, with patient's consent, to reduce the patient's risk of exposure to COVID-19 and provide continuity of care for an established patient. Services were provided through a video synchronous discussion virtually to substitute for in-person clinic visit.

## 2021-07-07 NOTE — PATIENT INSTRUCTIONS
Goals in preparing for bariatric surgery    You should be giving up all beverages that have carbonation, sugar, and caffeine. (Refer to the approved liquids list provided at initial visit)   You should be drinking 64 ounces of calorie free fluids per day. Suggestions include:  o Water (you may add fresh lemon or lime)  o Crystal Light  o Johnston Liquid Water Enhancer  o Propel Zero  o Powerade Zero  o Isopure  o Rbizz7K  o SOBE Lifewater Zero  o Vitamin Water Zero  o Sugar Free Mario-Aid      You should be eating 4-6 times per day.  Three small meals plus 1-2 snacks per day is your goal. This balances your calories and nutrients evenly throughout the day and helps to boost your metabolism. Snacking is a good thing if you are choosing healthful options. Refer to the snack list provided at your initial visit. Aim for a protein at every snack, plus a fruit, vegetable or starch. You should be eating protein at every meal and snack.  Protein is typically found in animal sources, i.e. chicken, beef, pork, fish and seafood, eggs. It is also found in low-fat dairy sources such as skim or 1% milk, low-fat yogurt, low-fat cheese, and low-fat cottage cheese. Plant based sources of protein include peanut butter, beans, and soy. You should be utilizing the 9-inch plate method.  Eating on a smaller plate will help you control portion size. But what you put on your plate counts also. Make ¼ of your plate protein, ¼ starch and ½ the plate non-starchy vegetables. You should be eliminating caffeine.  Caffeine is dehydrating. After surgery, its very important to stay hydrated. Giving up caffeine before surgery will help you focus on the changes necessary to be successful after surgery. There are many decaffeinated coffee and tea products available in grocery stores. You should be reducing added fat and sugar in your diet.  Frying foods adds too much fat and calories.  Baking, broiling, or grilling meats add flavor without unhealthy fats. Using cooking oil spray and spray butter products are also healthful changes that will aid in your weight loss. Foods high in sugar are often also high in calories and low in nutrients. Eating habits after surgery will have to be a permanent and long-term change. Eating habits are so ingrained that it can be difficult to change. It is important to practice new eating habits prior to surgery to mentally prepare yourself for the challenge ahead. Also remember that overall health, age, and genetics make each persons weight loss progress different. Do not compare your progress (pre or post-operatively), the amount you eat, or exercise to other patients. In addition, it is the responsibility of the patient to schedule and follow up on labs and tests completed during the pre-operative period. Results will be reviewed at each visit. Patient received dietary handouts and education.

## 2021-07-27 RX ORDER — LISINOPRIL AND HYDROCHLOROTHIAZIDE 25; 20 MG/1; MG/1
TABLET ORAL
Qty: 135 TABLET | Refills: 1 | Status: SHIPPED | OUTPATIENT
Start: 2021-07-27

## 2021-07-27 NOTE — TELEPHONE ENCOUNTER
Last office visit 4/26/2021     Last written 2-8-2021 x 1 refill    Next office visit scheduled Not scheduled    Requested Prescriptions     Pending Prescriptions Disp Refills    lisinopril-hydroCHLOROthiazide (PRINZIDE;ZESTORETIC) 20-25 MG per tablet 135 tablet 1     Sig: TAKE 1/2 TABLET BY MOUTH EVERY DAY

## 2021-08-17 ENCOUNTER — TELEPHONE (OUTPATIENT)
Dept: BARIATRICS/WEIGHT MGMT | Age: 31
End: 2021-08-17

## 2021-08-17 ENCOUNTER — TELEPHONE (OUTPATIENT)
Dept: PULMONOLOGY | Age: 31
End: 2021-08-17

## 2021-08-17 NOTE — TELEPHONE ENCOUNTER
Appointment canceled for Shayleehuyen Saji ([id number])   Visit Type: OFFICE VISIT   Date        Time      Length    Provider                  Department   8/20/2021    1:00 PM  20 mins. Kelin Bean, APRN - CNP      [facility]      Reason for Cancellation: Rescheduled      Patient Comments: I would like to reschedule for a virtual visit, if possible.

## 2021-08-17 NOTE — TELEPHONE ENCOUNTER
Tried to call pt re EGD for 8/27/21 ar FF arrive at 7:45. Mail box was full could not LM. He needs to have covid testing  On  8-23-21.

## 2021-08-20 ENCOUNTER — VIRTUAL VISIT (OUTPATIENT)
Dept: PULMONOLOGY | Age: 31
End: 2021-08-20
Payer: COMMERCIAL

## 2021-08-20 ENCOUNTER — TELEPHONE (OUTPATIENT)
Dept: PULMONOLOGY | Age: 31
End: 2021-08-20

## 2021-08-20 DIAGNOSIS — G47.33 SEVERE OBSTRUCTIVE SLEEP APNEA: Primary | ICD-10-CM

## 2021-08-20 DIAGNOSIS — Z71.89 ENCOUNTER FOR BIPAP USE COUNSELING: ICD-10-CM

## 2021-08-20 DIAGNOSIS — E66.01 MORBID OBESITY WITH BMI OF 60.0-69.9, ADULT (HCC): ICD-10-CM

## 2021-08-20 PROCEDURE — 99213 OFFICE O/P EST LOW 20 MIN: CPT | Performed by: NURSE PRACTITIONER

## 2021-08-20 ASSESSMENT — SLEEP AND FATIGUE QUESTIONNAIRES
HOW LIKELY ARE YOU TO NOD OFF OR FALL ASLEEP WHILE LYING DOWN TO REST IN THE AFTERNOON WHEN CIRCUMSTANCES PERMIT: 1
HOW LIKELY ARE YOU TO NOD OFF OR FALL ASLEEP IN A CAR, WHILE STOPPED FOR A FEW MINUTES IN TRAFFIC: 0
HOW LIKELY ARE YOU TO NOD OFF OR FALL ASLEEP WHILE SITTING INACTIVE IN A PUBLIC PLACE: 0
ESS TOTAL SCORE: 1
HOW LIKELY ARE YOU TO NOD OFF OR FALL ASLEEP WHILE WATCHING TV: 0
HOW LIKELY ARE YOU TO NOD OFF OR FALL ASLEEP WHILE SITTING AND TALKING TO SOMEONE: 0
HOW LIKELY ARE YOU TO NOD OFF OR FALL ASLEEP WHILE SITTING AND READING: 0
HOW LIKELY ARE YOU TO NOD OFF OR FALL ASLEEP WHEN YOU ARE A PASSENGER IN A CAR FOR AN HOUR WITHOUT A BREAK: 0
HOW LIKELY ARE YOU TO NOD OFF OR FALL ASLEEP WHILE SITTING QUIETLY AFTER LUNCH WITHOUT ALCOHOL: 0

## 2021-08-20 NOTE — PROGRESS NOTES
Subjective:      Patient ID: Marisol Akers is a 27 y.o. male. HPI         Previous  Marisol Akers is a 27 y.o. male for televideo appointment via video and audio doxy. me virtual visit for TORI follow up. States he is doing really well with BIPAP. Patient is using BiPAP   8 hrs/night. Not using humidifier. No snoring on BiPAP. The pressure is well tolerated. The mask is comfortable- full face. No mask leak. No significant daytime sleepiness. No nodding off when driving. No dry nose or throat. No fatigue. Bedtime is 10 pm- 2am  and rise time is 8 am. Sleep onset is few minutes. Wakes up 0 times at night total. No nocturia. No naps during the day. No headache in am. No weight gain. 0-1 caffienated beverages during the day. No alcohol. ESS is 1. Past Medical History:   Diagnosis Date    Essential hypertension     Herpes 06/2021    to eyes    Morbid obesity with BMI of 70 and over, adult ([facility] [address] 6/10/2015    Sleep apnea        Past Surgical History:        Procedure Laterality Date    TONSILLECTOMY         Allergies:  has No Known Allergies. Social History:    TOBACCO:   reports that he has never smoked. He has never used smokeless tobacco.  ETOH:   reports no history of alcohol use. Family History:       Problem Relation Age of Onset    Hypertension Father     Diabetes Mother     Hypertension Mother     Cancer Maternal Grandmother         Breast    Asthma Neg Hx     Emphysema Neg Hx     Heart Failure Neg Hx        Current Medications:    Current Outpatient Medications:     lisinopril-hydroCHLOROthiazide (PRINZIDE;ZESTORETIC) 20-25 MG per tablet, TAKE 1/2 TABLET BY MOUTH EVERY DAY, Disp: 135 tablet, Rfl: 1    valACYclovir HCl (VALTREX PO), Take by mouth daily, Disp: , Rfl:     Review of Systems    Objective:   Physical Exam  There were no vitals taken for this visit. 'RA  Exam:  Gen: No acute distress, does not appear to be in pain. Appears well developed and nourished.   HENT: Head is normocephalic and atraumatic. Normal appearing nose. External Ears normal.   Neck: No visualized mass. Trachea is midline   Eyes: EOM intact. No visible discharge. Resp:No visualized signs of difficulty breathing or respiratory distress, speaking in full sentences. Respiratory effort normal.  Neuro: Awake. Alert. Able to follow commands. No facial asymmetry. Psych: Oriented x 3. No anxiety. Normal affect. Data:   Split night 8/25/15: .7 Oxygen desaturation to 72%. Study showed controlled sleep related breathing disorder with BiPAP. Started on AutoBiPAP EPAP min 11, IPAP max 20 and PS of 4 cmH2O. BIPAP compliance data:  Compliance download report from 1/1/17to 1/30/17 reviewed today by me and showed patient is using machine 7:57 hrs/night with 100% compliance and AHI 4.0 within this time frame. 30/30days with greater than 4 hours of machine use. 90% pressure 19.4/15.4 cm H20    Compliance download report from 4/20/19 to 5/19/19 reviewed today by me and showed patient is using machine 7:52 hrs/night with 100% compliance and AHI 1.9 within this time frame. 30/30days with greater than 4 hours of machine use. 90% pressure 19.4/15.4 cm H20 on auto BIPAP    Compliance download report from 4/15/20 to 5/14/20 reviewed today by me and showed patient is using machine 7:45 hrs/night with 100% compliance and AHI 1.4 within this time frame. 30/30days with greater than 4 hours of machine use. 90% pressure 20/16 cm H20 on auto BIPAP 13-22, PS 4 cm H2O    NEW BIPAP:  Compliance download report from 6/12/20 to 8/12/20 reviewed today by me and showed patient is using machine 8:02 hrs/night with 100% compliance and AHI 1.4 within this time frame. 30/30days with greater than 4 hours of machine use.   90% pressure 18.9/14.9 cm H20 on auto BIPAP 13-22, PS 4 cm H2O    Compliance download report from 7/20/21 to 8/18/21 reviewed today by me and showed patient is using machine 7:57 hrs/night with 100% compliance and AHI 1.0 within this time frame. 30/30days with greater than 4 hours of machine use. 90% pressure 19/15 cm H20 on auto BIPAP 13-22, PS 4 cm H2O    Assessment:      1. Severe TORI. .7. Auto BIPAP EPAP min 13, IPAP max 22, PS 4, Full face mask. Optimal compliance and efficacy on review today. 2. Witnessed apnea - resolved on BIPAP  3. HTN  4. Obesity      Plan:      - AutoBiPAP EPAP min 13, IPAP max 22 and PS of 4 cmH2O.  -Discussed severity of sleep apnea and importance of treatment  - Advised to use BiPAP 6-8 hrs at night and during naps. - Replacement of mask, tubing, head straps every 3-6 months or sooner if damaged. - Patient instructed to contact DME company for any mask, tubing or machine trouble shooting if problems arise.  - Sleep hygiene  - Avoid sedatives, alcohol and caffeinated drinks at bed time. - Patient counseled to never drive or operate heavy machinery while fatigue, drowsy or sleepy. - Weight loss is recommended as a long-term intervention.    - Complications of TORI if not treated were discussed with patient patient, including: systemic hypertension, pulmonary hypertension, cardiovascular morbidities, car accidents and all cause mortality.  -Patient education regarding sleep tips and PAP cleaning recommendations       -Discussed [employer] recently recalled some Pap units. Patient encouraged to call DME to see if his unit(s) is on recall and register it if so. Discussed risks/benefits of untreated sleep apnea as well as risks/benefits of use of unit if recalled. At this time, if patients have moderate to severe sleep apnea or have severe daytime sleepiness, it is recommended continue therapy until the machine can be replaced. Based upon the information we have so far, it appears the risk of stopping therapy may be higher than the risks associated with foam degradation.   However, there are still many unknown variables and each patient will have to make a final determination on his or her own. Alternative treatment options were discussed. Patient states he plans to continue to use current unit at this time    Please only use cleaning methods recommended by . Consent for telehealth visit was obtained and is noted in chart      THIS VISIT WAS COMPLETED VIRTUALLY USING DOXY. Glenis Benavides is a 27 y.o. male being evaluated by a Virtual Visit (video visit) encounter to address concerns as mentioned above. A caregiver was present when appropriate. Due to this being a TeleHealth encounter (During EUGVH-49 public health emergency), evaluation of the following organ systems was limited: Vitals/Constitutional/EENT/Resp/CV/GI//MS/Neuro/Skin/Heme-Lymph-Imm. Pursuant to the emergency declaration under the [address] [place] [address] authority and the [employer] Act, this Virtual Visit was conducted with patient's (and/or legal guardian's) consent, to reduce the patient's risk of exposure to COVID-19 and provide necessary medical care. The patient (and/or legal guardian) has also been advised to contact this office for worsening conditions or problems, and seek emergency medical treatment and/or call 911 if deemed necessary. Patient identification was verified at the start of the visit: Yes    Total time spent for this encounter: Not billed by time    Services were provided through a video synchronous discussion virtually to substitute for in-person clinic visit. Patient and provider were located at their individual homes. --Olivia Mayorga, ABDON - CNP on 8/20/2021 at 11:40 AM    An electronic signature was used to authenticate this note.

## 2021-08-20 NOTE — TELEPHONE ENCOUNTER
Within this Telehealth Consent, the terms you and yours refer to the person using the Telehealth Service (Service), or in the case of a use of the Service by or on behalf of a minor, you and yours refer to and include (i) the parent or legal guardian who provides consent to the use of the Service by such minor or uses the Service on behalf of such minor, and (ii) the minor for whom consent is being provided or on whose behalf the Service is being utilized. When using Service, you will be consulting with your health care providers via the use of Telehealth.   Telehealth involves the delivery of healthcare services using electronic communications, information technology or other means between a healthcare provider and a patient who are not in the same physical location. Telehealth may be used for diagnosis, treatment, follow-up and/or patient education, and may include, but is not limited to, one or more of the following:    Electronic transmission of medical records, photo images, personal health information or other data between a patient and a healthcare provider    Interactions between a patient and healthcare provider via audio, video and/or data communications    Use of output data from medical devices, sound and video files    Anticipated Benefits   The use of Telehealth by your Provider(s) through the Service may have the following possible benefits:    Making it easier and more efficient for you to access medical care and treatment for the conditions treated by such Provider(s) utilizing the Service    Allowing you to obtain medical care and treatment by Provider(s) at times that are convenient for you    Enabling you to interact with Provider(s) without the necessity of an in-office appointment     Possible Risks   While the use of Telehealth can provide potential benefits for you, there are also potential risks associated with the use of Telehealth.  These risks include, but may not be limited to the following:    Your Provider(s) may not able to provide medical treatment for your particular condition and you may be required to seek alternative healthcare or emergency care services.  The electronic systems or other security protocols or safeguards used in the Service could fail, causing a breach of privacy of your medical or other information.  Given regulatory requirements in certain jurisdictions, your Provider(s) diagnosis and/or treatment options, especially pertaining to certain prescriptions, may be limited. Acceptance   1. You understand that Services will be provided via Telehealth. This process involves the use of HIPAA compliant and secure, real-time audio-visual interfacing with a qualified and appropriately trained provider located at [facility]. 2. You understand that, under no circumstances, will this session be recorded. 3. You understand that the Provider(s) at [facility] and other clinical participants will be party to the information obtained during the Telehealth session in accordance with best medical practices. 4. You understand that the information obtained during the Telehealth session will be used to help determine the most appropriate treatment options. 5. You understand that You have the right to revoke this consent at any point in time. 6. You understand that Telehealth is voluntary, and that continued treatment is not dependent upon consent. 7. You understand that, in the event of non-consent to Telehealth services and/or technical difficulties, you will obtain services as typically provided in the absence of Telehealth technology. 8. You understand that this consent will be kept in Your medical record. 9. No potential benefits from the use of Telehealth or specific results can be guaranteed. Your condition may not be cured or improved and, in some cases, may get worse.    10. There are limitations in the provision of medical care and treatment via Telehealth and the Service and you may not be able to receive diagnosis and/or treatment through the Service for every condition for which you seek diagnosis and/or treatment. 11. There are potential risks to the use of Telehealth, including but not limited to the risks described in this Telehealth Consent. 12. Your Provider(s) have discussed the use of Telehealth and the Service with you, including the benefits and risks of such and you have provided oral consent to your Provider(s) for the use of Telehealth and the Service. 15. You understand that it is your duty to provide your Provider(s) truthful, accurate and complete information, including all relevant information regarding care that you may have received or may be receiving from other healthcare providers outside of the Service. 14. You understand that each of your Provider(s) may determine in his or sole discretion that your condition is not suitable for diagnosis and/or treatment using the Service, and that you may need to seek medical care and treatment a specialist or other healthcare provider, outside of the Service. 15. You understand that you are fully responsible for payment for all services provided by Provider(s) or through use of the Service and that you may not be able to use third-party insurance. 16. You represent that (a) you have read this Telehealth Consent carefully, (b) you understand the risks and benefits of the Service and the use of Telehealth in the medical care and treatment provided to you by Provider(s) using the Service, and (c) you have the legal capacity and authority to provide this consent for yourself and/or the minor for which you are consenting under applicable federal and state laws, including laws relating to the age of [de-identified] and/or parental/guardian consent.    17. You give your informed consent to the use of Telehealth by Provider(s) using the Service under the terms described in the Terms of Service and this Telehealth Consent. The patient was read the following statement and has consented to the visit as of 8/20/21. The patient has been scheduled for their first telehealth visit on 8/20/21 with Kostas Finch CNP.

## 2021-08-20 NOTE — PATIENT INSTRUCTIONS

## 2021-10-05 ENCOUNTER — VIRTUAL VISIT (OUTPATIENT)
Dept: FAMILY MEDICINE CLINIC | Age: 31
End: 2021-10-05
Payer: COMMERCIAL

## 2021-10-05 DIAGNOSIS — H18.609 KERATOCONUS, UNSPECIFIED LATERALITY: Primary | ICD-10-CM

## 2021-10-05 PROCEDURE — 99213 OFFICE O/P EST LOW 20 MIN: CPT | Performed by: PHYSICIAN ASSISTANT

## 2021-10-05 SDOH — ECONOMIC STABILITY: FOOD INSECURITY: WITHIN THE PAST 12 MONTHS, YOU WORRIED THAT YOUR FOOD WOULD RUN OUT BEFORE YOU GOT MONEY TO BUY MORE.: NEVER TRUE

## 2021-10-05 SDOH — ECONOMIC STABILITY: FOOD INSECURITY: WITHIN THE PAST 12 MONTHS, THE FOOD YOU BOUGHT JUST DIDN'T LAST AND YOU DIDN'T HAVE MONEY TO GET MORE.: NEVER TRUE

## 2021-10-05 ASSESSMENT — ENCOUNTER SYMPTOMS
EYE ITCHING: 0
EYE REDNESS: 0
EYE PAIN: 0
FACIAL SWELLING: 0
PHOTOPHOBIA: 0
EYE DISCHARGE: 1

## 2021-10-05 ASSESSMENT — SOCIAL DETERMINANTS OF HEALTH (SDOH): HOW HARD IS IT FOR YOU TO PAY FOR THE VERY BASICS LIKE FOOD, HOUSING, MEDICAL CARE, AND HEATING?: NOT HARD AT ALL

## 2021-10-05 NOTE — PROGRESS NOTES
10/5/2021    TELEHEALTH EVALUATION -- Audio/Visual (During JRWHS-44 public health emergency)    HPI:    Julia Camara (:  1990) has requested an audio/video evaluation for the following concern(s):    [place]: Will be getting fitted for contact lenses today or tomorrow through [facility]  Has been off work for the last three weeks due to inability to see the screen  He reports that he is unable to read street signs so he only drives short distances  Symptoms are becoming more consistent and daily      Review of Systems   Constitutional: Negative for fever. HENT: Negative for facial swelling. Eyes: Positive for discharge and visual disturbance. Negative for photophobia, pain, redness and itching. Neurological: Positive for headaches. Negative for dizziness. Prior to Visit Medications    Medication Sig Taking?  Authorizing Provider   lisinopril-hydroCHLOROthiazide (PRINZIDE;ZESTORETIC) 20-25 MG per tablet TAKE 1/2 TABLET BY MOUTH EVERY DAY Yes Jennifer Amen E Heindl, DO   valACYclovir HCl (VALTREX PO) Take by mouth daily Yes Historical Provider, MD       Social History     Tobacco Use    Smoking status: Never Smoker    Smokeless tobacco: Never Used   Vaping Use    Vaping Use: Never used   Substance Use Topics    Alcohol use: No     Alcohol/week: 0.0 standard drinks    Drug use: No        No Known Allergies    PHYSICAL EXAMINATION:  [ INSTRUCTIONS:  \"[x]\" Indicates a positive item  \"[]\" Indicates a negative item  -- DELETE ALL ITEMS NOT EXAMINED]  Vital Signs: (As obtained by patient/caregiver or practitioner observation)    Blood pressure-  Heart rate-    Respiratory rate- 14   Temperature-  Pulse oximetry-     Constitutional: [x] Appears well-developed and well-nourished [x] No apparent distress      [] Abnormal-   Mental status  [x] Alert and awake  [x] Oriented to person/place/time [x]Able to follow commands      Eyes:  EOM    [x]  Normal  [] Abnormal-  Sclera  []  Normal  [] Abnormal -

## 2021-10-08 ENCOUNTER — PATIENT MESSAGE (OUTPATIENT)
Dept: FAMILY MEDICINE CLINIC | Age: 31
End: 2021-10-08

## 2022-01-25 ENCOUNTER — TELEPHONE (OUTPATIENT)
Dept: FAMILY MEDICINE CLINIC | Age: 32
End: 2022-01-25

## 2022-01-25 NOTE — TELEPHONE ENCOUNTER
Patient already has an appt set up with [address] [place] [address],[address] for Thursday to discuss this.

## 2022-01-25 NOTE — TELEPHONE ENCOUNTER
----- Message from Lia Blancsteffanie sent at 1/25/2022  8:36 AM EST -----  Subject: Message to Provider    QUESTIONS  Information for Provider? pt clld tested pos for covid on [id number] needs a   Dr note for employer to show had to quarantine --did at home test  ---------------------------------------------------------------------------  --------------  CALL BACK INFO  What is the best way for the office to contact you? OK to leave message on   voicemail  Preferred Call Back Phone Number? [phone]  ---------------------------------------------------------------------------  --------------  SCRIPT ANSWERS  Relationship to Patient?  Self

## 2022-01-27 ENCOUNTER — VIRTUAL VISIT (OUTPATIENT)
Dept: FAMILY MEDICINE CLINIC | Age: 32
End: 2022-01-27
Payer: COMMERCIAL

## 2022-01-27 DIAGNOSIS — U07.1 COVID-19: Primary | ICD-10-CM

## 2022-01-27 PROCEDURE — 99213 OFFICE O/P EST LOW 20 MIN: CPT | Performed by: PHYSICIAN ASSISTANT

## 2022-01-27 ASSESSMENT — ENCOUNTER SYMPTOMS
WHEEZING: 0
SINUS PRESSURE: 0
SHORTNESS OF BREATH: 0
SINUS PAIN: 0
RHINORRHEA: 0
CHEST TIGHTNESS: 1
COUGH: 1
SORE THROAT: 0

## 2022-01-27 ASSESSMENT — PATIENT HEALTH QUESTIONNAIRE - PHQ9
1. LITTLE INTEREST OR PLEASURE IN DOING THINGS: 0
SUM OF ALL RESPONSES TO PHQ QUESTIONS 1-9: 0
SUM OF ALL RESPONSES TO PHQ QUESTIONS 1-9: 0
2. FEELING DOWN, DEPRESSED OR HOPELESS: 0
SUM OF ALL RESPONSES TO PHQ QUESTIONS 1-9: 0
SUM OF ALL RESPONSES TO PHQ9 QUESTIONS 1 & 2: 0
SUM OF ALL RESPONSES TO PHQ QUESTIONS 1-9: 0

## 2022-01-27 NOTE — PROGRESS NOTES
2022    TELEHEALTH EVALUATION -- Audio/Visual (During CCAID-01 public health emergency)    HPI:    Nicolás Cannon (:  1990) has requested an audio/video evaluation for the following concern(s):    COVID:  Patient was positive for covid 8 days ago, took a home test  Last day at work was the 18  Current symptoms: fatigue, very rare dry cough  Denies: fever, shortness of breath, chest tightness, congestion, myalgia, chill  Would like ot go back on Monday the     Review of Systems   Constitutional: Positive for fatigue. Negative for activity change, appetite change and unexpected weight change. HENT: Negative for congestion, ear discharge, ear pain, postnasal drip, rhinorrhea, sinus pressure, sinus pain and sore throat. Respiratory: Positive for cough and chest tightness. Negative for shortness of breath and wheezing. Cardiovascular: Negative for palpitations. Skin: Negative for pallor. Neurological: Negative for dizziness and headaches. Prior to Visit Medications    Medication Sig Taking?  Authorizing Provider   lisinopril-hydroCHLOROthiazide (PRINZIDE;ZESTORETIC) 20-25 MG per tablet TAKE 1/2 TABLET BY MOUTH EVERY DAY Yes Radha MA Heindl, DO   valACYclovir HCl (VALTREX PO) Take by mouth daily Yes Historical Provider, MD       Social History     Tobacco Use    Smoking status: Never Smoker    Smokeless tobacco: Never Used   Vaping Use    Vaping Use: Never used   Substance Use Topics    Alcohol use: No     Alcohol/week: 0.0 standard drinks    Drug use: No        No Known Allergies    PHYSICAL EXAMINATION:  [ INSTRUCTIONS:  \"[x]\" Indicates a positive item  \"[]\" Indicates a negative item  -- DELETE ALL ITEMS NOT EXAMINED]  Vital Signs: (As obtained by patient/caregiver or practitioner observation)    Blood pressure-  Heart rate-    Respiratory rate- 14   Temperature- 98.6 Pulse oximetry-     Constitutional: [x] Appears well-developed and well-nourished [x] No apparent distress      [] Abnormal-   Mental status  [x] Alert and awake  [x] Oriented to person/place/time []Able to follow commands      Eyes:  EOM    [x]  Normal  [] Abnormal-  Sclera  [x]  Normal  [] Abnormal -         Discharge [x]  None visible  [] Abnormal -    HENT:   [x] Normocephalic, atraumatic. [x] Abnormal - pt sounds congested  [x] Mouth/Throat: Mucous membranes are moist.     External Ears [x] Normal  [] Abnormal-     Neck: [x] No visualized mass     Pulmonary/Chest: [x] Respiratory effort normal.  [x] No visualized signs of difficulty breathing or respiratory distress        [] Abnormal-      Musculoskeletal:   [] Normal gait with no signs of ataxia         [] Normal range of motion of neck        [] Abnormal-       Neurological:        [x] No Facial Asymmetry (Cranial nerve 7 motor function) (limited exam to video visit)          [] No gaze palsy        [] Abnormal-         Skin:        [x] No significant exanthematous lesions or discoloration noted on facial skin         [] Abnormal-            Psychiatric:       [] Normal Affect [] No Hallucinations        [] Abnormal-     Other pertinent observable physical exam findings-     ASSESSMENT/PLAN:  1. COVID-19  -  Pt to email over paperwork for leave when he receives the documents  - XR CHEST STANDARD (2 VW); Future      Return if symptoms worsen or fail to improve. Nicolás Cannon, was evaluated through a synchronous (real-time) audio-video encounter. The patient (or guardian if applicable) is aware that this is a billable service, which includes applicable co-pays. This Virtual Visit was conducted with patient's (and/or legal guardian's) consent. The visit was conducted pursuant to the emergency declaration under the [facility], [address] [place] [address] authority and the [employer] Act. Patient identification was verified, and a caregiver was present when appropriate.  The patient was located at home in a state where the provider was licensed to provide care. Total time spent on this encounter: Not billed by time    --BRITTNEE Pillai on 1/27/2022 at 9:39 AM    An electronic signature was used to authenticate this note.

## 2022-04-08 ENCOUNTER — TELEPHONE (OUTPATIENT)
Dept: FAMILY MEDICINE CLINIC | Age: 32
End: 2022-04-08

## 2022-06-13 NOTE — TELEPHONE ENCOUNTER
Refill Request     CONFIRM preferrred pharmacy with the patient. If Mail Order Rx - Pend for 90 day refill.       Last Seen: Last Seen Department: 1/27/2022    Last Seen by PCP: 9/1/2020    Last Written: n/a    Next Appointment: n/a  Future Appointments   Date Time Provider Bassam Last   8/24/2022 11:00 AM ABDON Velazquez CNP [facility]       Future appointment scheduled      Requested Prescriptions     Pending Prescriptions Disp Refills    valACYclovir (VALTREX) 500 MG tablet       Sig: Take by mouth daily

## 2022-06-14 RX ORDER — VALACYCLOVIR HYDROCHLORIDE 500 MG/1
TABLET, FILM COATED ORAL DAILY
OUTPATIENT
Start: 2022-06-14

## 2022-06-15 NOTE — TELEPHONE ENCOUNTER
Spoke with pt, pt stated that they were prescribed by the optomologist, pt stated he seen them yesterday and they prescribed this medication for him

## 2022-08-24 ENCOUNTER — TELEPHONE (OUTPATIENT)
Dept: PULMONOLOGY | Age: 32
End: 2022-08-24

## 2022-08-24 NOTE — TELEPHONE ENCOUNTER
Patient did not show for OV  with Cindy Borders on 8/24/22. Reason:  na    This is patient's first no show. Patient was ano show on: na.      Patient did not reschedule.   Reschedule date:  na

## 2022-09-12 RX ORDER — LISINOPRIL AND HYDROCHLOROTHIAZIDE 25; 20 MG/1; MG/1
TABLET ORAL
Qty: 45 TABLET | Refills: 1 | OUTPATIENT
Start: 2022-09-12

## 2022-09-12 NOTE — TELEPHONE ENCOUNTER
Refill Request     CONFIRM preferrred pharmacy with the patient. If Mail Order Rx - Pend for 90 day refill. Last Seen: Last Seen Department: 1/27/2022  Last Seen by PCP: 9/1/2020    Last Written: 7/27/21 with 1 refill #135    If no future appointment scheduled, route STAFF MESSAGE with patient name to the [facility] for scheduling. Next Appointment:   No future appointments. Message sent to [address] [place] [address] to schedule appt with patient? YES pt due for in office visit.  Physical and HTN       Requested Prescriptions     Pending Prescriptions Disp Refills    lisinopril-hydroCHLOROthiazide (PRINZIDE;ZESTORETIC) 20-25 MG per tablet [Pharmacy Med Name: Lisinopril-hydroCHLOROthiazide 20-25 MG Oral Tablet] 45 tablet 3     Sig: TAKE 1/2 TABLET BY MOUTH  DAILY

## 2022-09-12 NOTE — TELEPHONE ENCOUNTER
Patient has not seen Dr Troy Tadeo in two years and has recently been seeing Matilde Funez. Is he changing providers?

## 2022-09-12 NOTE — TELEPHONE ENCOUNTER
Spoke with patient. He stated he is currently seeing [facility] closer to where he lives, but ultimately wants to stay with our office and eventually switch to [address] [place] [address],[address] (will let us know when).

## 2023-04-03 ENCOUNTER — TELEPHONE (OUTPATIENT)
Dept: FAMILY MEDICINE CLINIC | Age: 33
End: 2023-04-03

## 2023-04-03 NOTE — TELEPHONE ENCOUNTER
Patient requested VV I called him back to see if he could be seen in person but he has to call us back

## 2023-04-03 NOTE — TELEPHONE ENCOUNTER
----- Message from Sharda Willis sent at 4/3/2023 12:08 PM EDT -----  Subject: Appointment Request    Reason for Call: Established Patient Appointment needed: Routine Existing   Condition Follow Up    QUESTIONS    Reason for appointment request? No appointments available during search     Additional Information for Provider? pt wants VV check up appt  He also   needs FMLA paper work  He will add on Mychart   ---------------------------------------------------------------------------  --------------  Dean WILCOX  [phone]; OK to leave message on voicemail  ---------------------------------------------------------------------------  --------------  SCRIPT ANSWERS  COVID Screen: Ela Bran

## 2023-04-10 DIAGNOSIS — R73.9 HYPERGLYCEMIA: ICD-10-CM

## 2023-04-10 DIAGNOSIS — I10 ESSENTIAL HYPERTENSION: ICD-10-CM

## 2023-04-10 DIAGNOSIS — E66.01 MORBID OBESITY WITH BMI OF 70 AND OVER, ADULT (HCC): ICD-10-CM

## 2023-04-10 LAB
ALBUMIN SERPL-MCNC: 3.8 G/DL (ref 3.4–5)
ALBUMIN/GLOB SERPL: 1 {RATIO} (ref 1.1–2.2)
ALP SERPL-CCNC: 74 U/L (ref 40–129)
ALT SERPL-CCNC: 36 U/L (ref 10–40)
ANION GAP SERPL CALCULATED.3IONS-SCNC: 17 MMOL/L (ref 3–16)
AST SERPL-CCNC: 23 U/L (ref 15–37)
BASOPHILS # BLD: 0.1 K/UL (ref 0–0.2)
BASOPHILS NFR BLD: 0.4 %
BILIRUB SERPL-MCNC: 0.3 MG/DL (ref 0–1)
BUN SERPL-MCNC: 10 MG/DL (ref 7–20)
CALCIUM SERPL-MCNC: 9.2 MG/DL (ref 8.3–10.6)
CHLORIDE SERPL-SCNC: 101 MMOL/L (ref 99–110)
CHOLEST SERPL-MCNC: 170 MG/DL (ref 0–199)
CO2 SERPL-SCNC: 22 MMOL/L (ref 21–32)
CREAT SERPL-MCNC: 0.6 MG/DL (ref 0.9–1.3)
DEPRECATED RDW RBC AUTO: 17 % (ref 12.4–15.4)
EOSINOPHIL # BLD: 0.3 K/UL (ref 0–0.6)
EOSINOPHIL NFR BLD: 2.3 %
GFR SERPLBLD CREATININE-BSD FMLA CKD-EPI: >60 ML/MIN/{1.73_M2}
GLUCOSE SERPL-MCNC: 120 MG/DL (ref 70–99)
HCT VFR BLD AUTO: 41.9 % (ref 40.5–52.5)
HDLC SERPL-MCNC: 35 MG/DL (ref 40–60)
HGB BLD-MCNC: 13.4 G/DL (ref 13.5–17.5)
LDLC SERPL CALC-MCNC: 119 MG/DL
LYMPHOCYTES # BLD: 2.1 K/UL (ref 1–5.1)
LYMPHOCYTES NFR BLD: 17.1 %
MCH RBC QN AUTO: 25.3 PG (ref 26–34)
MCHC RBC AUTO-ENTMCNC: 32 G/DL (ref 31–36)
MCV RBC AUTO: 79.3 FL (ref 80–100)
MONOCYTES # BLD: 0.8 K/UL (ref 0–1.3)
MONOCYTES NFR BLD: 6.7 %
NEUTROPHILS # BLD: 9 K/UL (ref 1.7–7.7)
NEUTROPHILS NFR BLD: 73.5 %
PLATELET # BLD AUTO: 491 K/UL (ref 135–450)
PMV BLD AUTO: 8.6 FL (ref 5–10.5)
POTASSIUM SERPL-SCNC: 4.4 MMOL/L (ref 3.5–5.1)
PROT SERPL-MCNC: 7.8 G/DL (ref 6.4–8.2)
RBC # BLD AUTO: 5.29 M/UL (ref 4.2–5.9)
SODIUM SERPL-SCNC: 140 MMOL/L (ref 136–145)
TRIGL SERPL-MCNC: 79 MG/DL (ref 0–150)
TSH SERPL DL<=0.005 MIU/L-ACNC: 3.82 UIU/ML (ref 0.27–4.2)
VLDLC SERPL CALC-MCNC: 16 MG/DL
WBC # BLD AUTO: 12.2 K/UL (ref 4–11)

## 2023-04-11 LAB
EST. AVERAGE GLUCOSE BLD GHB EST-MCNC: 148.5 MG/DL
HBA1C MFR BLD: 6.8 %

## 2023-04-24 PROBLEM — T46.6X5A SIMVASTATIN INDUCED NEUROPATHY (HCC): Status: RESOLVED | Noted: 2023-04-24 | Resolved: 2023-04-24

## 2023-04-24 PROBLEM — T38.0X5A STEROID-INDUCED MYOPATHY: Status: RESOLVED | Noted: 2023-04-24 | Resolved: 2023-04-24

## 2023-04-24 PROBLEM — T46.6X5A SIMVASTATIN INDUCED NEUROPATHY (HCC): Status: ACTIVE | Noted: 2023-04-24

## 2023-04-24 PROBLEM — G72.0 STEROID-INDUCED MYOPATHY: Status: ACTIVE | Noted: 2023-04-24

## 2023-04-24 PROBLEM — E11.8 CONTROLLED TYPE 2 DIABETES MELLITUS WITH COMPLICATION, WITHOUT LONG-TERM CURRENT USE OF INSULIN (HCC): Status: ACTIVE | Noted: 2023-04-24

## 2023-04-24 PROBLEM — G62.0 SIMVASTATIN INDUCED NEUROPATHY (HCC): Status: RESOLVED | Noted: 2023-04-24 | Resolved: 2023-04-24

## 2023-04-24 PROBLEM — G62.0 SIMVASTATIN INDUCED NEUROPATHY (HCC): Status: ACTIVE | Noted: 2023-04-24

## 2023-04-24 PROBLEM — T38.0X5A STEROID-INDUCED MYOPATHY: Status: ACTIVE | Noted: 2023-04-24

## 2023-04-24 PROBLEM — G72.0 STEROID-INDUCED MYOPATHY: Status: RESOLVED | Noted: 2023-04-24 | Resolved: 2023-04-24
